# Patient Record
Sex: MALE | HISPANIC OR LATINO | Employment: FULL TIME | URBAN - METROPOLITAN AREA
[De-identification: names, ages, dates, MRNs, and addresses within clinical notes are randomized per-mention and may not be internally consistent; named-entity substitution may affect disease eponyms.]

---

## 2018-10-25 ENCOUNTER — OFFICE VISIT (OUTPATIENT)
Dept: FAMILY MEDICINE CLINIC | Facility: CLINIC | Age: 58
End: 2018-10-25

## 2018-10-25 VITALS
HEART RATE: 72 BPM | BODY MASS INDEX: 30.72 KG/M2 | DIASTOLIC BLOOD PRESSURE: 82 MMHG | WEIGHT: 207.4 LBS | RESPIRATION RATE: 18 BRPM | TEMPERATURE: 98.3 F | SYSTOLIC BLOOD PRESSURE: 124 MMHG | HEIGHT: 69 IN | OXYGEN SATURATION: 98 %

## 2018-10-25 DIAGNOSIS — Z00.00 HEALTHCARE MAINTENANCE: Primary | ICD-10-CM

## 2018-10-25 LAB
ALBUMIN SERPL-MCNC: 4.59 G/DL (ref 3.2–4.8)
ALBUMIN/GLOB SERPL: 2.1 G/DL (ref 1.5–2.5)
ALP SERPL-CCNC: 83 U/L (ref 25–100)
ALT SERPL W P-5'-P-CCNC: 27 U/L (ref 7–40)
ANION GAP SERPL CALCULATED.3IONS-SCNC: 3 MMOL/L (ref 3–11)
ARTICHOKE IGE QN: 156 MG/DL (ref 0–130)
AST SERPL-CCNC: 16 U/L (ref 0–33)
BASOPHILS # BLD AUTO: 0.04 10*3/MM3 (ref 0–0.2)
BASOPHILS NFR BLD AUTO: 0.5 % (ref 0–1)
BILIRUB BLD-MCNC: NEGATIVE MG/DL
BILIRUB SERPL-MCNC: 0.4 MG/DL (ref 0.3–1.2)
BUN BLD-MCNC: 15 MG/DL (ref 9–23)
BUN/CREAT SERPL: 16 (ref 7–25)
CALCIUM SPEC-SCNC: 9.6 MG/DL (ref 8.7–10.4)
CHLORIDE SERPL-SCNC: 106 MMOL/L (ref 99–109)
CHOLEST SERPL-MCNC: 213 MG/DL (ref 0–200)
CLARITY, POC: CLEAR
CO2 SERPL-SCNC: 30 MMOL/L (ref 20–31)
COLOR UR: YELLOW
CREAT BLD-MCNC: 0.94 MG/DL (ref 0.6–1.3)
CRP SERPL-MCNC: 0.29 MG/DL (ref 0–1)
DEPRECATED RDW RBC AUTO: 44 FL (ref 37–54)
EOSINOPHIL # BLD AUTO: 0.19 10*3/MM3 (ref 0–0.3)
EOSINOPHIL NFR BLD AUTO: 2.2 % (ref 0–3)
ERYTHROCYTE [DISTWIDTH] IN BLOOD BY AUTOMATED COUNT: 13.5 % (ref 11.3–14.5)
GFR SERPL CREATININE-BSD FRML MDRD: 83 ML/MIN/1.73
GLOBULIN UR ELPH-MCNC: 2.2 GM/DL
GLUCOSE BLD-MCNC: 83 MG/DL (ref 70–100)
GLUCOSE UR STRIP-MCNC: NEGATIVE MG/DL
HBA1C MFR BLD: 6.1 % (ref 4.8–5.6)
HCT VFR BLD AUTO: 47.6 % (ref 38.9–50.9)
HCV AB SER DONR QL: NORMAL
HDLC SERPL-MCNC: 40 MG/DL (ref 40–60)
HGB BLD-MCNC: 15.8 G/DL (ref 13.1–17.5)
HIV1+2 AB SER QL: NORMAL
IMM GRANULOCYTES # BLD: 0.02 10*3/MM3 (ref 0–0.03)
IMM GRANULOCYTES NFR BLD: 0.2 % (ref 0–0.6)
KETONES UR QL: NEGATIVE
LEUKOCYTE EST, POC: NEGATIVE
LYMPHOCYTES # BLD AUTO: 3.77 10*3/MM3 (ref 0.6–4.8)
LYMPHOCYTES NFR BLD AUTO: 43.5 % (ref 24–44)
MCH RBC QN AUTO: 29.8 PG (ref 27–31)
MCHC RBC AUTO-ENTMCNC: 33.2 G/DL (ref 32–36)
MCV RBC AUTO: 89.6 FL (ref 80–99)
MONOCYTES # BLD AUTO: 0.56 10*3/MM3 (ref 0–1)
MONOCYTES NFR BLD AUTO: 6.5 % (ref 0–12)
NEUTROPHILS # BLD AUTO: 4.1 10*3/MM3 (ref 1.5–8.3)
NEUTROPHILS NFR BLD AUTO: 47.3 % (ref 41–71)
NITRITE UR-MCNC: NEGATIVE MG/ML
PH UR: 6.5 [PH] (ref 5–8)
PLATELET # BLD AUTO: 235 10*3/MM3 (ref 150–450)
PMV BLD AUTO: 12.7 FL (ref 6–12)
POTASSIUM BLD-SCNC: 4.2 MMOL/L (ref 3.5–5.5)
PROT SERPL-MCNC: 6.8 G/DL (ref 5.7–8.2)
PROT UR STRIP-MCNC: NEGATIVE MG/DL
PSA SERPL-MCNC: 0.32 NG/ML (ref 0–4)
RBC # BLD AUTO: 5.31 10*6/MM3 (ref 4.2–5.76)
RBC # UR STRIP: NEGATIVE /UL
SODIUM BLD-SCNC: 139 MMOL/L (ref 132–146)
SP GR UR: 1.02 (ref 1–1.03)
TRIGL SERPL-MCNC: 270 MG/DL (ref 0–150)
TSH SERPL DL<=0.05 MIU/L-ACNC: 1.02 MIU/ML (ref 0.35–5.35)
URATE SERPL-MCNC: 7.3 MG/DL (ref 3.7–9.2)
UROBILINOGEN UR QL: NORMAL
WBC NRBC COR # BLD: 8.66 10*3/MM3 (ref 3.5–10.8)

## 2018-10-25 PROCEDURE — 80061 LIPID PANEL: CPT | Performed by: FAMILY MEDICINE

## 2018-10-25 PROCEDURE — 90686 IIV4 VACC NO PRSV 0.5 ML IM: CPT | Performed by: FAMILY MEDICINE

## 2018-10-25 PROCEDURE — 84550 ASSAY OF BLOOD/URIC ACID: CPT | Performed by: FAMILY MEDICINE

## 2018-10-25 PROCEDURE — 90472 IMMUNIZATION ADMIN EACH ADD: CPT | Performed by: FAMILY MEDICINE

## 2018-10-25 PROCEDURE — 86140 C-REACTIVE PROTEIN: CPT | Performed by: FAMILY MEDICINE

## 2018-10-25 PROCEDURE — 86803 HEPATITIS C AB TEST: CPT | Performed by: FAMILY MEDICINE

## 2018-10-25 PROCEDURE — 36415 COLL VENOUS BLD VENIPUNCTURE: CPT | Performed by: FAMILY MEDICINE

## 2018-10-25 PROCEDURE — 80053 COMPREHEN METABOLIC PANEL: CPT | Performed by: FAMILY MEDICINE

## 2018-10-25 PROCEDURE — 99396 PREV VISIT EST AGE 40-64: CPT | Performed by: FAMILY MEDICINE

## 2018-10-25 PROCEDURE — G0432 EIA HIV-1/HIV-2 SCREEN: HCPCS | Performed by: FAMILY MEDICINE

## 2018-10-25 PROCEDURE — 90632 HEPA VACCINE ADULT IM: CPT | Performed by: FAMILY MEDICINE

## 2018-10-25 PROCEDURE — 90715 TDAP VACCINE 7 YRS/> IM: CPT | Performed by: FAMILY MEDICINE

## 2018-10-25 PROCEDURE — 83036 HEMOGLOBIN GLYCOSYLATED A1C: CPT | Performed by: FAMILY MEDICINE

## 2018-10-25 PROCEDURE — G0103 PSA SCREENING: HCPCS | Performed by: FAMILY MEDICINE

## 2018-10-25 PROCEDURE — 90471 IMMUNIZATION ADMIN: CPT | Performed by: FAMILY MEDICINE

## 2018-10-25 PROCEDURE — 81003 URINALYSIS AUTO W/O SCOPE: CPT | Performed by: FAMILY MEDICINE

## 2018-10-25 PROCEDURE — 84443 ASSAY THYROID STIM HORMONE: CPT | Performed by: FAMILY MEDICINE

## 2018-10-25 PROCEDURE — 85025 COMPLETE CBC W/AUTO DIFF WBC: CPT | Performed by: FAMILY MEDICINE

## 2018-10-25 NOTE — PROGRESS NOTES
Subjective   Alf Fields is a 58 y.o. male.     History of Present Illness   He is here for his annual fasting wellness evaluation.  He is amendable to updating his immunizations.  He voices concerns with ED and is requesting a script for a phosphodiesterase inhibitor.     Review of Systems   Constitutional: Negative.    HENT: Negative.    Eyes: Negative.    Respiratory: Negative.    Cardiovascular: Negative.    Gastrointestinal: Negative.    Endocrine: Negative.    Genitourinary: Negative.    Musculoskeletal: Negative.    Skin: Negative.    Allergic/Immunologic: Negative.    Neurological: Negative.    Hematological: Negative.    Psychiatric/Behavioral: Negative.        Objective   Physical Exam   Constitutional: He is oriented to person, place, and time. He appears well-developed and well-nourished. He is cooperative.   HENT:   Head: Normocephalic and atraumatic.   Right Ear: Hearing and external ear normal.   Left Ear: Hearing and external ear normal.   Nose: Nose normal.   Mouth/Throat: Uvula is midline, oropharynx is clear and moist and mucous membranes are normal.   Eyes: Pupils are equal, round, and reactive to light. Conjunctivae and EOM are normal. No scleral icterus.   Neck: Trachea normal and normal range of motion. Neck supple. No JVD present. Carotid bruit is not present. No thyromegaly present.   Cardiovascular: Normal rate, regular rhythm, normal heart sounds and intact distal pulses.    Pulmonary/Chest: Effort normal and breath sounds normal.   Abdominal: Soft. Bowel sounds are normal. There is no hepatosplenomegaly. There is no tenderness.   Musculoskeletal: Normal range of motion.   Lymphadenopathy:     He has no cervical adenopathy.   Neurological: He is alert and oriented to person, place, and time. He has normal strength and normal reflexes. No sensory deficit. Gait normal.   Skin: Skin is warm and dry.   Psychiatric: He has a normal mood and affect. His speech is normal and behavior is normal.  Judgment and thought content normal. Cognition and memory are normal.   Nursing note and vitals reviewed.      Assessment/Plan   Diagnoses and all orders for this visit:    Healthcare maintenance  -     POC Urinalysis Dipstick, Automated  -     Comprehensive Metabolic Panel  -     CBC & Differential  -     Lipid Panel  -     TSH  -     Uric Acid  -     C-reactive Protein  -     PSA Screen  -     HIV-1 / O / 2 Ag / Antibody 4th Generation  -     Hepatitis C Antibody  -     Hemoglobin A1c  -     Tdap Vaccine IM  -     Hepatitis A Vaccine Adult IM  -     Fluarix/Fluzone/Afluria Quad>6 Months  - Vaccine counseling and current VIS is provided for immunizations administered today.  -     Ambulatory Referral For Screening Colonoscopy    The patient is here for a health maintenance visit.  Currently, the patient consumes a healthy diet and has an adequate exercise regimen. Screening lab work is ordered.  Immunizations are reported as current.  Advice and education is given regarding nutrition, aerobic exercise, routine dental evaluations, routine eye exams, reproductive health, cardiovascular risk reduction, sunscreen use, self skin examination (annual dermatology evaluations) and seat belt use (general overall safety).  Further recommendations after lab evaluation.  Annual wellness evaluations recommended.

## 2018-10-29 DIAGNOSIS — N52.02 CORPORO-VENOUS OCCLUSIVE ERECTILE DYSFUNCTION: Primary | ICD-10-CM

## 2018-10-29 RX ORDER — SILDENAFIL CITRATE 20 MG/1
TABLET ORAL
Qty: 30 TABLET | Refills: 5 | Status: SHIPPED | OUTPATIENT
Start: 2018-10-29 | End: 2020-04-29 | Stop reason: SDUPTHER

## 2018-10-29 NOTE — PROGRESS NOTES
Your lab results are all stable and satisfactory.  Please follow a low fat, healthy heart diet and exercise daily.  If you have any questions or concerns, please don't hesitate to contact us.  Thank you.

## 2018-11-07 DIAGNOSIS — Z12.11 SCREENING FOR COLON CANCER: Primary | ICD-10-CM

## 2018-11-07 RX ORDER — SODIUM, POTASSIUM,MAG SULFATES 17.5-3.13G
SOLUTION, RECONSTITUTED, ORAL ORAL
Qty: 2 BOTTLE | Refills: 0 | Status: SHIPPED | OUTPATIENT
Start: 2018-11-07 | End: 2020-04-29

## 2018-11-12 ENCOUNTER — TELEPHONE (OUTPATIENT)
Dept: FAMILY MEDICINE CLINIC | Facility: CLINIC | Age: 58
End: 2018-11-12

## 2018-11-12 NOTE — TELEPHONE ENCOUNTER
"barbie orellana (Key: [id number])   PA sent 11/12/2018      Regarding: FW: Prescription Question  Contact: [phone]      ----- Message -----  From: Barbie Orellana  Sent: 11/12/2018  12:26 PM  To: [facility]  Subject: Prescription Question                            ----- Message from Mychart, Generic sent at 11/12/2018 12:26 PM EST -----    My Sildenafil prescription needs a \"PA\" from you. Can you let me know when this is complete so I can  the prescription?     Thanks,     Manny Orellana     "

## 2018-11-14 ENCOUNTER — LAB REQUISITION (OUTPATIENT)
Dept: LAB | Facility: HOSPITAL | Age: 58
End: 2018-11-14

## 2018-11-14 ENCOUNTER — OUTSIDE FACILITY SERVICE (OUTPATIENT)
Dept: GASTROENTEROLOGY | Facility: CLINIC | Age: 58
End: 2018-11-14

## 2018-11-14 DIAGNOSIS — Z12.11 ENCOUNTER FOR SCREENING FOR MALIGNANT NEOPLASM OF COLON: ICD-10-CM

## 2018-11-14 PROCEDURE — 88305 TISSUE EXAM BY PATHOLOGIST: CPT | Performed by: INTERNAL MEDICINE

## 2018-11-14 PROCEDURE — 45385 COLONOSCOPY W/LESION REMOVAL: CPT | Performed by: INTERNAL MEDICINE

## 2018-11-15 LAB
CYTO UR: NORMAL
LAB AP CASE REPORT: NORMAL
LAB AP CLINICAL INFORMATION: NORMAL
PATH REPORT.FINAL DX SPEC: NORMAL
PATH REPORT.GROSS SPEC: NORMAL

## 2020-04-29 ENCOUNTER — OFFICE VISIT (OUTPATIENT)
Dept: FAMILY MEDICINE CLINIC | Facility: CLINIC | Age: 60
End: 2020-04-29

## 2020-04-29 VITALS
BODY MASS INDEX: 30.6 KG/M2 | TEMPERATURE: 97.8 F | OXYGEN SATURATION: 98 % | RESPIRATION RATE: 20 BRPM | WEIGHT: 206.6 LBS | DIASTOLIC BLOOD PRESSURE: 80 MMHG | HEIGHT: 69 IN | SYSTOLIC BLOOD PRESSURE: 132 MMHG | HEART RATE: 67 BPM

## 2020-04-29 DIAGNOSIS — K27.9 PEPTIC ULCER DISEASE: Primary | ICD-10-CM

## 2020-04-29 DIAGNOSIS — N52.02 CORPORO-VENOUS OCCLUSIVE ERECTILE DYSFUNCTION: ICD-10-CM

## 2020-04-29 PROBLEM — N52.9 ERECTILE DYSFUNCTION: Status: ACTIVE | Noted: 2018-01-01

## 2020-04-29 PROCEDURE — 99213 OFFICE O/P EST LOW 20 MIN: CPT | Performed by: FAMILY MEDICINE

## 2020-04-29 RX ORDER — PANTOPRAZOLE SODIUM 40 MG/1
40 TABLET, DELAYED RELEASE ORAL DAILY
Qty: 30 TABLET | Refills: 1 | Status: SHIPPED | OUTPATIENT
Start: 2020-04-29 | End: 2020-04-29

## 2020-04-29 RX ORDER — PANTOPRAZOLE SODIUM 40 MG/1
40 TABLET, DELAYED RELEASE ORAL DAILY
Qty: 30 TABLET | Refills: 1 | Status: SHIPPED | OUTPATIENT
Start: 2020-04-29 | End: 2020-07-01 | Stop reason: SDUPTHER

## 2020-04-29 RX ORDER — SILDENAFIL CITRATE 20 MG/1
TABLET ORAL
Qty: 30 TABLET | Refills: 5 | Status: SHIPPED | OUTPATIENT
Start: 2020-04-29 | End: 2020-07-01 | Stop reason: SDUPTHER

## 2020-04-29 NOTE — ASSESSMENT & PLAN NOTE
Patient is tried sildenafil in the past but was unable to afford it.  He was given refill on this and advised to use good Rx at [facility] to make prescription affordable.

## 2020-04-29 NOTE — ASSESSMENT & PLAN NOTE
Patient having epigastric pain could be peptic ulcer disease versus gastritis versus GERD versus abnormal presentation for gallbladder disease.  Patient symptoms most suspicious of peptic ulcer disease.  Will treat with Protonix daily.  If patient continues to have symptoms we will send for ultrasound gallbladder.

## 2020-04-29 NOTE — PROGRESS NOTES
Alf Fields is a 60 y.o. male who presents today for Abdominal Pain      Abdominal Pain   This is a recurrent problem. The current episode started more than 1 year ago (Had ultrasound of galbladder in 2014. Has come and gone since that time sometimes would not have episode for a year. Has had episodes more frequently over the past several months. Happens a few times a week now.). The onset quality is gradual. The problem occurs intermittently. The most recent episode lasted 3 hours (Peak of pain last a couple of hours. Has lingering soreness for several days. Last episode was friday and still feels sore. ). The problem has been gradually worsening (waxes and wanes but worsening in past few months. ). The pain is located in the epigastric region. The pain is at a severity of 9/10 (9-8/10 at its worst. Currently 2/10.). The pain is severe. The quality of the pain is aching and burning. The abdominal pain does not radiate. Associated symptoms include belching. Pertinent negatives include no anorexia, arthralgias, constipation, diarrhea, dysuria, fever, flatus, frequency, headaches, hematochezia, hematuria, melena, myalgias, nausea, vomiting or weight loss. The pain is aggravated by certain positions, movement and eating (spicy food, greasy food worsened he avoids now but is unsure what triggered friday. recumbency.). The pain is relieved by liquids and standing. He has tried antacids (Advil has helped with pain) for the symptoms. The treatment provided no relief. Prior diagnostic workup includes ultrasound (US in 2014. Nothing for recent episodes. ). There is no history of abdominal surgery, colon cancer, Crohn's disease, gallstones, GERD, irritable bowel syndrome, pancreatitis, PUD or ulcerative colitis.        Review of Systems   Constitutional: Negative for fever and unexpected weight loss.   Gastrointestinal: Positive for abdominal pain. Negative for anorexia, constipation, diarrhea, flatus, hematochezia, melena,  "nausea and vomiting.   Genitourinary: Negative for dysuria, frequency and hematuria.   Musculoskeletal: Negative for arthralgias and myalgias.        The following portions of the patient's history were reviewed and updated as appropriate: allergies, current medications, past family history, past medical history, past social history, past surgical history and problem list.    Current Outpatient Medications on File Prior to Visit   Medication Sig Dispense Refill   • diphenhydrAMINE HCl (ALLERGY MED PO) Take  by mouth.     • [DISCONTINUED] sildenafil (REVATIO) 20 MG tablet Take 3 to 5 tabs po once daily prn ED 30 tablet 5   • [DISCONTINUED] sodium-potassium-magnesium sulfates (SUPREP BOWEL PREP KIT) 17.5-3.13-1.6 GM/177ML solution oral solution Dispense 1 kit. Follow instructions that were mailed to your home. If you didn't receive these call [phone]. 4 bottle 0     No current facility-administered medications on file prior to visit.        No Known Allergies     Visit Vitals  /80 (BP Location: Right arm, Patient Position: Sitting, Cuff Size: Adult)   Pulse 67   Temp 97.8 °F (36.6 °C) (Temporal)   Resp 20   Ht 175.3 cm (69\")   Wt 93.7 kg (206 lb 9.6 oz)   SpO2 98%   BMI 30.51 kg/m²        Physical Exam   Constitutional: He is oriented to person, place, and time. He appears well-developed and well-nourished. No distress.   HENT:   Head: Atraumatic.   Eyes: EOM are normal.   Neck: Normal range of motion. Neck supple.   Cardiovascular: Normal rate, regular rhythm, normal heart sounds and intact distal pulses. Exam reveals no gallop and no friction rub.   No murmur heard.  Pulmonary/Chest: Effort normal and breath sounds normal. No respiratory distress. He has no wheezes. He has no rales.   Abdominal: Soft. Bowel sounds are normal. He exhibits no distension and no mass. There is tenderness (Epigastric tenderness no right upper quadrant tenderness). There is no rebound and no guarding. No hernia. "   Musculoskeletal: He exhibits no edema.   Neurological: He is alert and oriented to person, place, and time.   Skin: Skin is warm and dry. He is not diaphoretic.   Psychiatric: He has a normal mood and affect. His behavior is normal.             Problems Addressed this Visit        Digestive    Peptic ulcer disease - Primary     Patient having epigastric pain could be peptic ulcer disease versus gastritis versus GERD versus abnormal presentation for gallbladder disease.  Patient symptoms most suspicious of peptic ulcer disease.  Will treat with Protonix daily.  If patient continues to have symptoms we will send for ultrasound gallbladder.         Relevant Medications    pantoprazole (Protonix) 40 MG EC tablet       Other    Erectile dysfunction     Patient is tried sildenafil in the past but was unable to afford it.  He was given refill on this and advised to use good Rx at [facility] to make prescription affordable.         Relevant Medications    sildenafil (REVATIO) 20 MG tablet          Return in about 2 weeks (around 5/13/2020) for Annual.    Parts of this office note have been dictated by voice recognition software. Grammatical and/or spelling errors may be present.    Jvoi Kelley MD   4/29/2020

## 2020-07-01 DIAGNOSIS — N52.02 CORPORO-VENOUS OCCLUSIVE ERECTILE DYSFUNCTION: ICD-10-CM

## 2020-07-01 DIAGNOSIS — K27.9 PEPTIC ULCER DISEASE: ICD-10-CM

## 2020-07-01 RX ORDER — PANTOPRAZOLE SODIUM 40 MG/1
40 TABLET, DELAYED RELEASE ORAL DAILY
Qty: 30 TABLET | Refills: 1 | Status: SHIPPED | OUTPATIENT
Start: 2020-07-01 | End: 2020-08-28

## 2020-07-02 RX ORDER — SILDENAFIL CITRATE 20 MG/1
TABLET ORAL
Qty: 30 TABLET | Refills: 5 | Status: SHIPPED | OUTPATIENT
Start: 2020-07-02 | End: 2021-02-17

## 2020-07-15 DIAGNOSIS — N52.02 CORPORO-VENOUS OCCLUSIVE ERECTILE DYSFUNCTION: ICD-10-CM

## 2020-07-15 RX ORDER — SILDENAFIL CITRATE 20 MG/1
TABLET ORAL
Qty: 30 TABLET | Refills: 5 | OUTPATIENT
Start: 2020-07-15

## 2020-08-28 ENCOUNTER — OFFICE VISIT (OUTPATIENT)
Dept: FAMILY MEDICINE CLINIC | Facility: CLINIC | Age: 60
End: 2020-08-28

## 2020-08-28 VITALS
RESPIRATION RATE: 14 BRPM | BODY MASS INDEX: 30.57 KG/M2 | WEIGHT: 206.4 LBS | TEMPERATURE: 99.6 F | HEART RATE: 76 BPM | OXYGEN SATURATION: 98 % | DIASTOLIC BLOOD PRESSURE: 80 MMHG | HEIGHT: 69 IN | SYSTOLIC BLOOD PRESSURE: 132 MMHG

## 2020-08-28 DIAGNOSIS — K27.9 PEPTIC ULCER DISEASE: ICD-10-CM

## 2020-08-28 DIAGNOSIS — Z12.5 SCREENING FOR PROSTATE CANCER: ICD-10-CM

## 2020-08-28 DIAGNOSIS — Z00.00 WELL ADULT EXAM: Primary | ICD-10-CM

## 2020-08-28 DIAGNOSIS — M47.816 SPONDYLOSIS OF LUMBAR REGION WITHOUT MYELOPATHY OR RADICULOPATHY: ICD-10-CM

## 2020-08-28 DIAGNOSIS — R10.11 POSTPRANDIAL RUQ PAIN: ICD-10-CM

## 2020-08-28 DIAGNOSIS — Z72.0 SMOKING TRYING TO QUIT: ICD-10-CM

## 2020-08-28 PROCEDURE — 85025 COMPLETE CBC W/AUTO DIFF WBC: CPT | Performed by: FAMILY MEDICINE

## 2020-08-28 PROCEDURE — 99396 PREV VISIT EST AGE 40-64: CPT | Performed by: FAMILY MEDICINE

## 2020-08-28 PROCEDURE — 84443 ASSAY THYROID STIM HORMONE: CPT | Performed by: FAMILY MEDICINE

## 2020-08-28 PROCEDURE — 90471 IMMUNIZATION ADMIN: CPT | Performed by: FAMILY MEDICINE

## 2020-08-28 PROCEDURE — 90632 HEPA VACCINE ADULT IM: CPT | Performed by: FAMILY MEDICINE

## 2020-08-28 PROCEDURE — 83036 HEMOGLOBIN GLYCOSYLATED A1C: CPT | Performed by: FAMILY MEDICINE

## 2020-08-28 PROCEDURE — 80061 LIPID PANEL: CPT | Performed by: FAMILY MEDICINE

## 2020-08-28 PROCEDURE — 80053 COMPREHEN METABOLIC PANEL: CPT | Performed by: FAMILY MEDICINE

## 2020-08-28 PROCEDURE — G0103 PSA SCREENING: HCPCS | Performed by: FAMILY MEDICINE

## 2020-08-28 RX ORDER — CYCLOBENZAPRINE HCL 10 MG
10 TABLET ORAL 3 TIMES DAILY PRN
Qty: 90 TABLET | Refills: 1 | Status: SHIPPED | OUTPATIENT
Start: 2020-08-28

## 2020-08-28 RX ORDER — PANTOPRAZOLE SODIUM 40 MG/1
40 TABLET, DELAYED RELEASE ORAL DAILY
Qty: 90 TABLET | Refills: 1 | Status: SHIPPED | OUTPATIENT
Start: 2020-08-28 | End: 2020-12-09 | Stop reason: SDUPTHER

## 2020-08-28 RX ORDER — VARENICLINE TARTRATE 1 MG/1
1 TABLET, FILM COATED ORAL 2 TIMES DAILY
Qty: 60 TABLET | Refills: 3 | Status: SHIPPED | OUTPATIENT
Start: 2020-09-25 | End: 2020-09-22 | Stop reason: SINTOL

## 2020-08-28 NOTE — PROGRESS NOTES
Alf Fields is a 60 y.o. male who presents today to complete annual exam.    Chief Complaint   Patient presents with   • Establish Care   • Annual Exam        Patient here to complete annual physical exam.  Patient states he eats a frequently unhealthy diet but tries to eat as best he can.  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  Patient does not exercise regularly.  He follows with him optometrist.  He has not been seen by dentist in decades and does not follow with dermatology.  Patient states he continues to have epigastric pain which has become more right upper quadrant pain lately that radiates to his back.  He notes that it worsens with fatty food greasy meals.  He also continues to have chronic low back pain and plantar fasciitis.       Review of Systems   Constitutional: Negative for fever and unexpected weight loss.   HENT: Negative for congestion, ear pain and sore throat.    Eyes: Negative for visual disturbance.   Respiratory: Negative for cough, shortness of breath and wheezing.    Cardiovascular: Negative for chest pain and palpitations.   Gastrointestinal: Positive for abdominal pain. Negative for blood in stool, constipation, diarrhea, nausea, vomiting and GERD.   Endocrine: Negative for polydipsia and polyuria.   Genitourinary: Negative for difficulty urinating.   Musculoskeletal: Positive for arthralgias and back pain. Negative for joint swelling.   Skin: Negative for rash and skin lesions.   Allergic/Immunologic: Negative for environmental allergies.   Neurological: Negative for seizures and syncope.   Hematological: Does not bruise/bleed easily.   Psychiatric/Behavioral: Negative for suicidal ideas.        PHQ-9 Depression Screening  Little interest or pleasure in doing things?     Feeling down, depressed, or hopeless?     Trouble falling or staying asleep, or sleeping too much?     Feeling tired or having little energy?     Poor appetite or overeating?     Feeling bad about yourself - or  that you are a failure or have let yourself or your family down?     Trouble concentrating on things, such as reading the newspaper or watching television?     Moving or speaking so slowly that other people could have noticed? Or the opposite - being so fidgety or restless that you have been moving around a lot more than usual?     Thoughts that you would be better off dead, or of hurting yourself in some way?     PHQ-9 Total Score     If you checked off any problems, how difficult have these problems made it for you to do your work, take care of things at home, or get along with other people?         Past Medical History:   Diagnosis Date   • Anxiety     Comes and goes with stress levels   • Degenerative joint disease (DJD) of lumbar spine    • Erectile dysfunction 2018    Can't afford the meds        Past Surgical History:   Procedure Laterality Date   • ANKLE ARTHROSCOPY Left    • COLONOSCOPY  2018        Family History   Problem Relation Age of Onset   • Other Mother         unknown   • Drug abuse Mother         Heroin   • Alcohol abuse Sister    • No Known Problems Daughter    • No Known Problems Son    • Cataracts Maternal Grandmother    • Arthritis Maternal Grandmother    • Other Maternal Grandfather         Unknown   • No Known Problems Daughter    • No Known Problems Son    • Early death Maternal Aunt         Hodgkins was 22 years old        Social History     Socioeconomic History   • Marital status:      Spouse name: Arely   • Number of children: 2   • Years of education: H.S.   • Highest education level: Not on file   Occupational History   • Occupation: Manager     Employer: [employer]   Tobacco Use   • Smoking status: Light Tobacco Smoker     Packs/day: 0.25     Years: 15.00     Pack years: 3.75     Types: Cigarettes     Last attempt to quit: 2018     Years since quittin.6   • Smokeless tobacco: Never Used   • Tobacco comment: I have tried and tried to quit. May want to try Chantix  "  Substance and Sexual Activity   • Alcohol use: No   • Drug use: No   • Sexual activity: Yes     Partners: Female     Birth control/protection: None        Current Outpatient Medications on File Prior to Visit   Medication Sig Dispense Refill   • sildenafil (REVATIO) 20 MG tablet Take 3 to 5 tabs po once daily prn ED 30 tablet 5   • [DISCONTINUED] pantoprazole (Protonix) 40 MG EC tablet Take 1 tablet by mouth Daily. 30 tablet 1   • [DISCONTINUED] diphenhydrAMINE HCl (ALLERGY MED PO) Take  by mouth.       No current facility-administered medications on file prior to visit.        No Known Allergies     Visit Vitals  /80   Pulse 76   Temp 99.6 °F (37.6 °C) (Temporal)   Resp 14   Ht 174 cm (68.5\")   Wt 93.6 kg (206 lb 6.4 oz)   SpO2 98%   BMI 30.93 kg/m²      Body mass index is 30.93 kg/m².    Physical Exam   Constitutional: He is oriented to person, place, and time. He appears well-developed and well-nourished. No distress.   HENT:   Head: Atraumatic.   Eyes: EOM are normal.   Neck: Normal range of motion. Neck supple.   Cardiovascular: Normal rate, regular rhythm, normal heart sounds and intact distal pulses. Exam reveals no gallop and no friction rub.   No murmur heard.  Pulmonary/Chest: Effort normal and breath sounds normal. No respiratory distress. He has no wheezes. He has no rales.   Abdominal: Soft. Bowel sounds are normal. He exhibits no distension and no mass. There is tenderness (Mild right upper quadrant tenderness.). There is no rebound and no guarding. No hernia.   Musculoskeletal: He exhibits no edema.   Neurological: He is alert and oriented to person, place, and time.   Skin: Skin is warm and dry. He is not diaphoretic.   Psychiatric: He has a normal mood and affect. His behavior is normal.             Problems Addressed this Visit        Digestive    Peptic ulcer disease    Relevant Medications    pantoprazole (Protonix) 40 MG EC tablet       Nervous and Auditory    Postprandial RUQ pain     " Patient will continue pantoprazole.  Will obtain right upper quadrant ultrasound.  If ultrasound did not show any gallbladder abnormality will refer to GI.         Relevant Orders    US Gallbladder       Musculoskeletal and Integument    Degenerative joint disease (DJD) of lumbar spine    Relevant Medications    cyclobenzaprine (FLEXERIL) 10 MG tablet       Other    Well adult exam - Primary     The patient is here for health maintenance visit.  Currently, the patient consumes a unhealthy diet and has an inadequate exercise regimen.  Screening lab work is ordered.  Immunizations were reviewed today.  Advice and education was given regarding nutrition, aerobic exercise, routine dental evaluations, routine eye exams, reproductive health, cardiovascular risk reduction, sunscreen use, self skin examination (annual dermatology evaluations) and seatbelt use (general overall safety).  Further recommendations will be given if needed after lab evaluation.  Annual wellness evaluation is recommended.           Relevant Orders    CBC & Differential    Comprehensive Metabolic Panel    Hemoglobin A1c    Lipid Panel    PSA Screen    TSH Rfx On Abnormal To Free T4    Hepatitis A Vaccine Adult IM (Completed)    CBC Auto Differential      Other Visit Diagnoses     Screening for prostate cancer        Relevant Orders    PSA Screen    Smoking trying to quit        Relevant Medications    varenicline (Chantix Starting Month Pantera) 0.5 MG X 11 & 1 MG X 42 tablet    varenicline (Chantix Continuing Month Pantera) 1 MG tablet (Start on 9/25/2020)          Return in about 6 months (around 2/28/2021) for Follow-up smoking and RUQ pain.    Parts of this office note have been dictated by voice recognition software. Grammatical and/or spelling errors may be present.     Jovi Kelley MD  8/28/2020

## 2020-08-28 NOTE — ASSESSMENT & PLAN NOTE
Patient will continue pantoprazole.  Will obtain right upper quadrant ultrasound.  If ultrasound did not show any gallbladder abnormality will refer to GI.

## 2020-08-28 NOTE — PATIENT INSTRUCTIONS
Preventive Care 40-64 Years Old, Male  Preventive care refers to lifestyle choices and visits with your health care provider that can promote health and wellness. This includes:  · A yearly physical exam. This is also called an annual well check.  · Regular dental and eye exams.  · Immunizations.  · Screening for certain conditions.  · Healthy lifestyle choices, such as eating a healthy diet, getting regular exercise, not using drugs or products that contain nicotine and tobacco, and limiting alcohol use.  What can I expect for my preventive care visit?  Physical exam  Your health care provider will check:  · Height and weight. These may be used to calculate body mass index (BMI), which is a measurement that tells if you are at a healthy weight.  · Heart rate and blood pressure.  · Your skin for abnormal spots.  Counseling  Your health care provider may ask you questions about:  · Alcohol, tobacco, and drug use.  · Emotional well-being.  · Home and relationship well-being.  · Sexual activity.  · Eating habits.  · Work and work environment.  What immunizations do I need?    Influenza (flu) vaccine  · This is recommended every year.  Tetanus, diphtheria, and pertussis (Tdap) vaccine  · You may need a Td booster every 10 years.  Varicella (chickenpox) vaccine  · You may need this vaccine if you have not already been vaccinated.  Zoster (shingles) vaccine  · You may need this after age 60.  Measles, mumps, and rubella (MMR) vaccine  · You may need at least one dose of MMR if you were born in 1957 or later. You may also need a second dose.  Pneumococcal conjugate (PCV13) vaccine  · You may need this if you have certain conditions and were not previously vaccinated.  Pneumococcal polysaccharide (PPSV23) vaccine  · You may need one or two doses if you smoke cigarettes or if you have certain conditions.  Meningococcal conjugate (MenACWY) vaccine  · You may need this if you have certain conditions.  Hepatitis A  vaccine  · You may need this if you have certain conditions or if you travel or work in places where you may be exposed to hepatitis A.  Hepatitis B vaccine  · You may need this if you have certain conditions or if you travel or work in places where you may be exposed to hepatitis B.  Haemophilus influenzae type b (Hib) vaccine  · You may need this if you have certain risk factors.  Human papillomavirus (HPV) vaccine  · If recommended by your health care provider, you may need three doses over 6 months.  You may receive vaccines as individual doses or as more than one vaccine together in one shot (combination vaccines). Talk with your health care provider about the risks and benefits of combination vaccines.  What tests do I need?  Blood tests  · Lipid and cholesterol levels. These may be checked every 5 years, or more frequently if you are over 50 years old.  · Hepatitis C test.  · Hepatitis B test.  Screening  · Lung cancer screening. You may have this screening every year starting at age 55 if you have a 30-pack-year history of smoking and currently smoke or have quit within the past 15 years.  · Prostate cancer screening. Recommendations will vary depending on your family history and other risks.  · Colorectal cancer screening. All adults should have this screening starting at age 50 and continuing until age 75. Your health care provider may recommend screening at age 45 if you are at increased risk. You will have tests every 1-10 years, depending on your results and the type of screening test.  · Diabetes screening. This is done by checking your blood sugar (glucose) after you have not eaten for a while (fasting). You may have this done every 1-3 years.  · Sexually transmitted disease (STD) testing.  Follow these instructions at home:  Eating and drinking  · Eat a diet that includes fresh fruits and vegetables, whole grains, lean protein, and low-fat dairy products.  · Take vitamin and mineral supplements as  recommended by your health care provider.  · Do not drink alcohol if your health care provider tells you not to drink.  · If you drink alcohol:  ? Limit how much you have to 0-2 drinks a day.  ? Be aware of how much alcohol is in your drink. In the [place] one drink equals one 12 oz bottle of beer (355 mL), one 5 oz glass of wine (148 mL), or one 1½ oz glass of hard liquor (44 mL).  Lifestyle  · Take daily care of your teeth and gums.  · Stay active. Exercise for at least 30 minutes on 5 or more days each week.  · Do not use any products that contain nicotine or tobacco, such as cigarettes, e-cigarettes, and chewing tobacco. If you need help quitting, ask your health care provider.  · If you are sexually active, practice safe sex. Use a condom or other form of protection to prevent STIs (sexually transmitted infections).  · Talk with your health care provider about taking a low-dose aspirin every day starting at age 50.  What's next?  · Go to your health care provider once a year for a well check visit.  · Ask your health care provider how often you should have your eyes and teeth checked.  · Stay up to date on all vaccines.  This information is not intended to replace advice given to you by your health care provider. Make sure you discuss any questions you have with your health care provider.  Document Released: 01/13/2017 Document Revised: 12/12/2019 Document Reviewed: 12/12/2019  [employer] Patient Education © 2020 [employer]

## 2020-08-29 LAB
ALBUMIN SERPL-MCNC: 4.7 G/DL (ref 3.5–5.2)
ALBUMIN/GLOB SERPL: 1.6 G/DL
ALP SERPL-CCNC: 78 U/L (ref 39–117)
ALT SERPL W P-5'-P-CCNC: 15 U/L (ref 1–41)
ANION GAP SERPL CALCULATED.3IONS-SCNC: 9.5 MMOL/L (ref 5–15)
AST SERPL-CCNC: 17 U/L (ref 1–40)
BASOPHILS # BLD AUTO: 0.06 10*3/MM3 (ref 0–0.2)
BASOPHILS NFR BLD AUTO: 0.8 % (ref 0–1.5)
BILIRUB SERPL-MCNC: 0.4 MG/DL (ref 0–1.2)
BUN SERPL-MCNC: 17 MG/DL (ref 6–20)
BUN/CREAT SERPL: 14.9 (ref 7–25)
CALCIUM SPEC-SCNC: 9.8 MG/DL (ref 8.6–10.5)
CHLORIDE SERPL-SCNC: 106 MMOL/L (ref 98–107)
CHOLEST SERPL-MCNC: 222 MG/DL (ref 0–200)
CO2 SERPL-SCNC: 24.5 MMOL/L (ref 22–29)
CREAT SERPL-MCNC: 1.14 MG/DL (ref 0.76–1.27)
DEPRECATED RDW RBC AUTO: 45.3 FL (ref 37–54)
EOSINOPHIL # BLD AUTO: 0.13 10*3/MM3 (ref 0–0.4)
EOSINOPHIL NFR BLD AUTO: 1.7 % (ref 0.3–6.2)
ERYTHROCYTE [DISTWIDTH] IN BLOOD BY AUTOMATED COUNT: 13.2 % (ref 12.3–15.4)
GFR SERPL CREATININE-BSD FRML MDRD: 66 ML/MIN/1.73
GFR SERPL CREATININE-BSD FRML MDRD: 80 ML/MIN/1.73
GLOBULIN UR ELPH-MCNC: 3 GM/DL
GLUCOSE SERPL-MCNC: 89 MG/DL (ref 65–99)
HBA1C MFR BLD: 5.4 % (ref 4.8–5.6)
HCT VFR BLD AUTO: 48 % (ref 37.5–51)
HDLC SERPL-MCNC: 39 MG/DL (ref 40–60)
HGB BLD-MCNC: 15.8 G/DL (ref 13–17.7)
IMM GRANULOCYTES # BLD AUTO: 0.02 10*3/MM3 (ref 0–0.05)
IMM GRANULOCYTES NFR BLD AUTO: 0.3 % (ref 0–0.5)
LDLC SERPL CALC-MCNC: 123 MG/DL (ref 0–100)
LDLC/HDLC SERPL: 3.16 {RATIO}
LYMPHOCYTES # BLD AUTO: 2.51 10*3/MM3 (ref 0.7–3.1)
LYMPHOCYTES NFR BLD AUTO: 33.5 % (ref 19.6–45.3)
MCH RBC QN AUTO: 30.3 PG (ref 26.6–33)
MCHC RBC AUTO-ENTMCNC: 32.9 G/DL (ref 31.5–35.7)
MCV RBC AUTO: 92.1 FL (ref 79–97)
MONOCYTES # BLD AUTO: 0.47 10*3/MM3 (ref 0.1–0.9)
MONOCYTES NFR BLD AUTO: 6.3 % (ref 5–12)
NEUTROPHILS NFR BLD AUTO: 4.3 10*3/MM3 (ref 1.7–7)
NEUTROPHILS NFR BLD AUTO: 57.4 % (ref 42.7–76)
NRBC BLD AUTO-RTO: 0 /100 WBC (ref 0–0.2)
PLATELET # BLD AUTO: 226 10*3/MM3 (ref 140–450)
PMV BLD AUTO: 12.2 FL (ref 6–12)
POTASSIUM SERPL-SCNC: 4.3 MMOL/L (ref 3.5–5.2)
PROT SERPL-MCNC: 7.7 G/DL (ref 6–8.5)
PSA SERPL-MCNC: 0.42 NG/ML (ref 0–4)
RBC # BLD AUTO: 5.21 10*6/MM3 (ref 4.14–5.8)
SODIUM SERPL-SCNC: 140 MMOL/L (ref 136–145)
TRIGL SERPL-MCNC: 298 MG/DL (ref 0–150)
TSH SERPL DL<=0.05 MIU/L-ACNC: 1.97 UIU/ML (ref 0.27–4.2)
VLDLC SERPL-MCNC: 59.6 MG/DL (ref 5–40)
WBC # BLD AUTO: 7.49 10*3/MM3 (ref 3.4–10.8)

## 2020-09-11 ENCOUNTER — HOSPITAL ENCOUNTER (OUTPATIENT)
Dept: ULTRASOUND IMAGING | Facility: HOSPITAL | Age: 60
Discharge: HOME | End: 2020-09-11
Admitting: FAMILY MEDICINE

## 2020-09-11 DIAGNOSIS — R10.11 POSTPRANDIAL RUQ PAIN: ICD-10-CM

## 2020-09-11 PROCEDURE — 76705 ECHO EXAM OF ABDOMEN: CPT

## 2020-09-22 ENCOUNTER — OFFICE VISIT (OUTPATIENT)
Dept: FAMILY MEDICINE CLINIC | Facility: CLINIC | Age: 60
End: 2020-09-22

## 2020-09-22 VITALS
HEIGHT: 69 IN | HEART RATE: 77 BPM | BODY MASS INDEX: 30.96 KG/M2 | WEIGHT: 209 LBS | OXYGEN SATURATION: 98 % | RESPIRATION RATE: 14 BRPM | DIASTOLIC BLOOD PRESSURE: 82 MMHG | TEMPERATURE: 98.6 F | SYSTOLIC BLOOD PRESSURE: 114 MMHG

## 2020-09-22 DIAGNOSIS — Z71.6 ENCOUNTER FOR SMOKING CESSATION COUNSELING: ICD-10-CM

## 2020-09-22 DIAGNOSIS — R10.11 POSTPRANDIAL RUQ PAIN: Primary | ICD-10-CM

## 2020-09-22 DIAGNOSIS — F41.9 ANXIETY: ICD-10-CM

## 2020-09-22 PROCEDURE — 90686 IIV4 VACC NO PRSV 0.5 ML IM: CPT | Performed by: FAMILY MEDICINE

## 2020-09-22 PROCEDURE — 99406 BEHAV CHNG SMOKING 3-10 MIN: CPT | Performed by: FAMILY MEDICINE

## 2020-09-22 PROCEDURE — 90471 IMMUNIZATION ADMIN: CPT | Performed by: FAMILY MEDICINE

## 2020-09-22 PROCEDURE — 99214 OFFICE O/P EST MOD 30 MIN: CPT | Performed by: FAMILY MEDICINE

## 2020-09-22 RX ORDER — VARENICLINE TARTRATE 0.5 MG/1
0.5 TABLET, FILM COATED ORAL 2 TIMES DAILY
Qty: 60 TABLET | Refills: 3 | Status: SHIPPED | OUTPATIENT
Start: 2020-09-22 | End: 2021-02-17 | Stop reason: SINTOL

## 2020-09-22 RX ORDER — ESCITALOPRAM OXALATE 10 MG/1
10 TABLET ORAL DAILY
Qty: 90 TABLET | Refills: 1 | Status: SHIPPED | OUTPATIENT
Start: 2020-09-22 | End: 2020-12-09 | Stop reason: SDUPTHER

## 2020-09-22 NOTE — PATIENT INSTRUCTIONS
Smoking Tobacco Information, Adult  Smoking tobacco can be harmful to your health. Tobacco contains a poisonous (toxic), colorless chemical called nicotine. Nicotine is addictive. It changes the brain and can make it hard to stop smoking. Tobacco also has other toxic chemicals that can hurt your body and raise your risk of many cancers.  How can smoking tobacco affect me?  Smoking tobacco puts you at risk for:  · Cancer. Smoking is most commonly associated with lung cancer, but can also lead to cancer in other parts of the body.  · Chronic obstructive pulmonary disease (COPD). This is a long-term lung condition that makes it hard to breathe. It also gets worse over time.  · High blood pressure (hypertension), heart disease, stroke, or heart attack.  · Lung infections, such as pneumonia.  · Cataracts. This is when the lenses in the eyes become clouded.  · Digestive problems. This may include peptic ulcers, heartburn, and gastroesophageal reflux disease (GERD).  · Oral health problems, such as gum disease and tooth loss.  · Loss of taste and smell.  Smoking can affect your appearance by causing:  · Wrinkles.  · Yellow or stained teeth, fingers, and fingernails.  Smoking tobacco can also affect your social life, because:  · It may be challenging to find places to smoke when away from home. Many workplaces, restaurants, hotels, and public places are tobacco-free.  · Smoking is expensive. This is due to the cost of tobacco and the long-term costs of treating health problems from smoking.  · Secondhand smoke may affect those around you. Secondhand smoke can cause lung cancer, breathing problems, and heart disease. Children of smokers have a higher risk for:  ? Sudden infant death syndrome (SIDS).  ? Ear infections.  ? Lung infections.  If you currently smoke tobacco, quitting now can help you:  · Lead a longer and healthier life.  · Look, smell, breathe, and feel better over time.  · Save money.  · Protect others from the  harms of secondhand smoke.  What actions can I take to prevent health problems?  Quit smoking    · Do not start smoking. Quit if you already do.  · Make a plan to quit smoking and commit to it. Look for programs to help you and ask your health care provider for recommendations and ideas.  · Set a date and write down all the reasons you want to quit.  · Let your friends and family know you are quitting so they can help and support you. Consider finding friends who also want to quit. It can be easier to quit with someone else, so that you can support each other.  · Talk with your health care provider about using nicotine replacement medicines to help you quit, such as gum, lozenges, patches, sprays, or pills.  · Do not replace cigarette smoking with electronic cigarettes, which are commonly called e-cigarettes. The safety of e-cigarettes is not known, and some may contain harmful chemicals.  · If you try to quit but return to smoking, stay positive. It is common to slip up when you first quit, so take it one day at a time.  · Be prepared for cravings. When you feel the urge to smoke, chew gum or suck on hard candy.  Lifestyle  · Stay busy and take care of your body.  · Drink enough fluid to keep your urine pale yellow.  · Get plenty of exercise and eat a healthy diet. This can help prevent weight gain after quitting.  · Monitor your eating habits. Quitting smoking can cause you to have a larger appetite than when you smoke.  · Find ways to relax. Go out with friends or family to a movie or a restaurant where people do not smoke.  · Ask your health care provider about having regular tests (screenings) to check for cancer. This may include blood tests, imaging tests, and other tests.  · Find ways to manage your stress, such as meditation, yoga, or exercise.  Where to find support  To get support to quit smoking, consider:  · Asking your health care provider for more information and resources.  · Taking classes to learn  more about quitting smoking.  · Looking for local organizations that offer resources about quitting smoking.  · Joining a support group for people who want to quit smoking in your local community.  · Calling the smokefree.gov counselor helpline: 4-800-Quit-Now (1-893.541.3322)  Where to find more information  You may find more information about quitting smoking from:  · HelpGuide.org: www.helpguide.org  · Smokefree.gov: smokefree.gov  · American Lung Association: www.lung.org  Contact a health care provider if you:  · Have problems breathing.  · Notice that your lips, nose, or fingers turn blue.  · Have chest pain.  · Are coughing up blood.  · Feel faint or you pass out.  · Have other health changes that cause you to worry.  Summary  · Smoking tobacco can negatively affect your health, the health of those around you, your finances, and your social life.  · Do not start smoking. Quit if you already do. If you need help quitting, ask your health care provider.  · Think about joining a support group for people who want to quit smoking in your local community. There are many effective programs that will help you to quit this behavior.  This information is not intended to replace advice given to you by your health care provider. Make sure you discuss any questions you have with your health care provider.  Document Released: 01/02/2018 Document Revised: 02/06/2019 Document Reviewed: 01/02/2018  Elsevier Patient Education © 2020 [employer]

## 2020-09-22 NOTE — ASSESSMENT & PLAN NOTE
Patient has had success in the past with medication but he does not know which one.  He would like to try medication again today.  Patient was started on Lexapro.  He will follow-up in 8 weeks for reevaluation of symptoms.

## 2020-09-22 NOTE — ASSESSMENT & PLAN NOTE
Alf Fields  reports that he has been smoking cigarettes. He has a 15.00 pack-year smoking history. He has never used smokeless tobacco.  He has currently decrease his smoking to 5 cigarettes a day. I have educated him on the risk of diseases from using tobacco products such as cancer, COPD and heart diease.     I advised him to quit and he is willing to quit. We have discussed the following method/s for tobacco cessation:  Education Material Counseling OTC Cessation Products Prescription Medicaiton.  Together we have set a quit date for 1 month from today.  He will follow up with me in 2 month or sooner to check on his progress.    I spent 7 minutes counseling the patient.

## 2020-09-22 NOTE — ASSESSMENT & PLAN NOTE
Ultrasound was unremarkable.  Patient has had some relief with pantoprazole but is still having symptoms.  Patient given referral to gastroenterology for further evaluation treatment.

## 2020-09-22 NOTE — PROGRESS NOTES
Alf Fields is a 60 y.o. male who presents today for Anxiety (x 10-14 days), Back Pain (chronic, this past week flare up has been bad), and Abdominal Pain (comes and goes at gallbladder locale)      He believes that his back pain was spurred on by starting Chantix. He stopped taking the medication 2 days ago and said that the pain has since subsided. Flexeril provided no relief. The only relief he found was by taking ibuprofen even though he knows that it can make his GI symptoms worse. He has a history of low back arthritis.  He has had increasing anxiety lately and does not know why. He has had anxiety in the past and took bupropion and zoloft, but currently takes nothing. He says that these were taken a long time ago, but he thinks they helped him a lot. He describes his anxiety as generalized because of the turmoil going on with the pandemic and his recent stomach and back pains.    Back Pain  This is a chronic (History of low back arthritis) problem. The current episode started in the past 7 days. The problem occurs constantly. The problem has been waxing and waning (Better today) since onset. The pain is present in the lumbar spine. The quality of the pain is described as shooting. The pain is at a severity of 7/10. The pain is the same all the time. The symptoms are aggravated by position, sitting and standing. Stiffness is present in the morning and all day. Associated symptoms include abdominal pain. Pertinent negatives include no bladder incontinence, bowel incontinence, chest pain, dysuria, fever, headaches, leg pain, numbness, paresis, paresthesias, perianal numbness, tingling, weakness or weight loss. He has tried NSAIDs for the symptoms. The treatment provided moderate relief.   Anxiety  Presents for follow-up visit. Symptoms include depressed mood, excessive worry, irritability, nausea and nervous/anxious behavior. Patient reports no chest pain, insomnia, shortness of breath or suicidal ideas.  Symptoms occur constantly. The severity of symptoms is interfering with daily activities. The quality of sleep is good. Nighttime awakenings: none.       Abdominal Pain  This is a chronic problem. The problem occurs intermittently. The problem has been gradually improving. The pain is located in the RUQ. The quality of the pain is aching. The abdominal pain does not radiate. Associated symptoms include anorexia and nausea. Pertinent negatives include no dysuria, fever, headaches or weight loss. The pain is aggravated by eating and palpation. He has tried proton pump inhibitors for the symptoms. The treatment provided moderate relief. Prior diagnostic workup includes ultrasound. His past medical history is significant for PUD.    NELDA-7  Over the last two weeks, how often have you been bothered by the following problems?  Feeling nervous, anxious or on edge: 1  Not being able to stop or control worryin  Worrying too much about different things: 1  Trouble Relaxin  Being so restless that it is hard to sit still: 0  Becoming easily annoyed or irritable: 1  Feeling afraid as if something awful might happen: 1  NELDA 7 Total Score: 6  If you checked any problems, how difficult have these problems made it for you to do your work, take care of things at home, or get along with other people: Very difficult        Review of Systems   Constitutional: Positive for irritability. Negative for fever and unexpected weight loss.   Eyes: Negative.    Respiratory: Negative for chest tightness and shortness of breath.    Cardiovascular: Negative for chest pain.   Gastrointestinal: Positive for abdominal pain, anorexia and nausea. Negative for bowel incontinence.   Genitourinary: Negative for dysuria and urinary incontinence.   Musculoskeletal: Positive for back pain.   Skin: Negative for color change and dry skin.   Neurological: Negative for tingling, weakness, numbness and paresthesias.   Psychiatric/Behavioral: Positive for  "depressed mood. Negative for suicidal ideas. The patient is nervous/anxious. The patient does not have insomnia.         The following portions of the patient's history were reviewed and updated as appropriate: allergies, current medications, past family history, past medical history, past social history, past surgical history and problem list.    Current Outpatient Medications on File Prior to Visit   Medication Sig Dispense Refill   • pantoprazole (Protonix) 40 MG EC tablet Take 1 tablet by mouth Daily. 90 tablet 1   • sildenafil (REVATIO) 20 MG tablet Take 3 to 5 tabs po once daily prn ED 30 tablet 5   • cyclobenzaprine (FLEXERIL) 10 MG tablet Take 1 tablet by mouth 3 (Three) Times a Day As Needed for Muscle Spasms. 90 tablet 1   • [DISCONTINUED] varenicline (Chantix Continuing Month Pantera) 1 MG tablet Take 1 tablet by mouth 2 (Two) Times a Day. 60 tablet 3   • [DISCONTINUED] varenicline (Chantix Starting Month Pantera) 0.5 MG X 11 & 1 MG X 42 tablet Take 0.5 mg one daily on days 1-3 and and 0.5 mg twice daily on days 4-7.Then 1 mg twice daily for a total of 12 weeks. 53 tablet 0     No current facility-administered medications on file prior to visit.        No Known Allergies     Visit Vitals  /82   Pulse 77   Temp 98.6 °F (37 °C) (Temporal)   Resp 14   Ht 175.3 cm (69\")   Wt 94.8 kg (209 lb)   SpO2 98%   BMI 30.86 kg/m²        Physical Exam  Constitutional:       Appearance: Normal appearance.   HENT:      Head: Normocephalic and atraumatic.      Nose: Nose normal. No congestion.      Mouth/Throat:      Mouth: Mucous membranes are moist.      Pharynx: Oropharynx is clear. No posterior oropharyngeal erythema.   Eyes:      Extraocular Movements: Extraocular movements intact.      Conjunctiva/sclera: Conjunctivae normal.      Pupils: Pupils are equal, round, and reactive to light.   Neck:      Musculoskeletal: Normal range of motion and neck supple. No muscular tenderness.   Cardiovascular:      Rate and Rhythm: " Normal rate and regular rhythm.      Pulses: Normal pulses.      Heart sounds: Normal heart sounds.   Pulmonary:      Effort: Pulmonary effort is normal.      Breath sounds: Normal breath sounds.   Abdominal:      General: Abdomen is flat. Bowel sounds are normal.      Palpations: Abdomen is soft.      Tenderness: There is abdominal tenderness in the right upper quadrant. There is guarding (RUQ).   Musculoskeletal:      Lumbar back: He exhibits tenderness (Midline). He exhibits normal range of motion.   Lymphadenopathy:      Cervical: No cervical adenopathy.   Skin:     General: Skin is warm and dry.   Neurological:      General: No focal deficit present.      Mental Status: He is alert and oriented to person, place, and time.   Psychiatric:         Attention and Perception: Attention normal.         Mood and Affect: Mood and affect normal.         Speech: Speech normal.         Behavior: Behavior normal.          Results for orders placed or performed in visit on 08/28/20   Comprehensive Metabolic Panel    Specimen: Arm, Left; Blood   Result Value Ref Range    Glucose 89 65 - 99 mg/dL    BUN 17 6 - 20 mg/dL    Creatinine 1.14 0.76 - 1.27 mg/dL    Sodium 140 136 - 145 mmol/L    Potassium 4.3 3.5 - 5.2 mmol/L    Chloride 106 98 - 107 mmol/L    CO2 24.5 22.0 - 29.0 mmol/L    Calcium 9.8 8.6 - 10.5 mg/dL    Total Protein 7.7 6.0 - 8.5 g/dL    Albumin 4.70 3.50 - 5.20 g/dL    ALT (SGPT) 15 1 - 41 U/L    AST (SGOT) 17 1 - 40 U/L    Alkaline Phosphatase 78 39 - 117 U/L    Total Bilirubin 0.4 0.0 - 1.2 mg/dL    eGFR Non African Amer 66 >60 mL/min/1.73    eGFR  African Amer 80 >60 mL/min/1.73    Globulin 3.0 gm/dL    A/G Ratio 1.6 g/dL    BUN/Creatinine Ratio 14.9 7.0 - 25.0    Anion Gap 9.5 5.0 - 15.0 mmol/L   Hemoglobin A1c    Specimen: Arm, Left; Blood   Result Value Ref Range    Hemoglobin A1C 5.40 4.80 - 5.60 %   Lipid Panel    Specimen: Arm, Left; Blood   Result Value Ref Range    Total Cholesterol 222 (H) 0 - 200  mg/dL    Triglycerides 298 (H) 0 - 150 mg/dL    HDL Cholesterol 39 (L) 40 - 60 mg/dL    LDL Cholesterol  123 (H) 0 - 100 mg/dL    VLDL Cholesterol 59.6 (H) 5 - 40 mg/dL    LDL/HDL Ratio 3.16    PSA Screen    Specimen: Arm, Left; Blood   Result Value Ref Range    PSA 0.420 0.000 - 4.000 ng/mL   TSH Rfx On Abnormal To Free T4    Specimen: Arm, Left; Blood   Result Value Ref Range    TSH 1.970 0.270 - 4.200 uIU/mL   CBC Auto Differential    Specimen: Arm, Left; Blood   Result Value Ref Range    WBC 7.49 3.40 - 10.80 10*3/mm3    RBC 5.21 4.14 - 5.80 10*6/mm3    Hemoglobin 15.8 13.0 - 17.7 g/dL    Hematocrit 48.0 37.5 - 51.0 %    MCV 92.1 79.0 - 97.0 fL    MCH 30.3 26.6 - 33.0 pg    MCHC 32.9 31.5 - 35.7 g/dL    RDW 13.2 12.3 - 15.4 %    RDW-SD 45.3 37.0 - 54.0 fl    MPV 12.2 (H) 6.0 - 12.0 fL    Platelets 226 140 - 450 10*3/mm3    Neutrophil % 57.4 42.7 - 76.0 %    Lymphocyte % 33.5 19.6 - 45.3 %    Monocyte % 6.3 5.0 - 12.0 %    Eosinophil % 1.7 0.3 - 6.2 %    Basophil % 0.8 0.0 - 1.5 %    Immature Grans % 0.3 0.0 - 0.5 %    Neutrophils, Absolute 4.30 1.70 - 7.00 10*3/mm3    Lymphocytes, Absolute 2.51 0.70 - 3.10 10*3/mm3    Monocytes, Absolute 0.47 0.10 - 0.90 10*3/mm3    Eosinophils, Absolute 0.13 0.00 - 0.40 10*3/mm3    Basophils, Absolute 0.06 0.00 - 0.20 10*3/mm3    Immature Grans, Absolute 0.02 0.00 - 0.05 10*3/mm3    nRBC 0.0 0.0 - 0.2 /100 WBC        Problems Addressed this Visit        Nervous and Auditory    Postprandial RUQ pain - Primary     Ultrasound was unremarkable.  Patient has had some relief with pantoprazole but is still having symptoms.  Patient given referral to gastroenterology for further evaluation treatment.         Relevant Orders    Ambulatory Referral to Gastroenterology       Other    Anxiety     Patient has had success in the past with medication but he does not know which one.  He would like to try medication again today.  Patient was started on Lexapro.  He will follow-up in 8 weeks for  reevaluation of symptoms.         Relevant Medications    escitalopram (Lexapro) 10 MG tablet    Encounter for smoking cessation counseling     Alf Fields  reports that he has been smoking cigarettes. He has a 15.00 pack-year smoking history. He has never used smokeless tobacco.  He has currently decrease his smoking to 5 cigarettes a day. I have educated him on the risk of diseases from using tobacco products such as cancer, COPD and heart diease.     I advised him to quit and he is willing to quit. We have discussed the following method/s for tobacco cessation:  Education Material Counseling OTC Cessation Products Prescription Medicaiton.  Together we have set a quit date for 1 month from today.  He will follow up with me in 2 month or sooner to check on his progress.    I spent 7 minutes counseling the patient.                Relevant Medications    varenicline (Chantix) 0.5 MG tablet          Return in about 8 weeks (around 11/17/2020) for Follow-up anxiety smoking.    Parts of this office note have been dictated by voice recognition software. Grammatical and/or spelling errors may be present.    Jovi Kelley MD   9/22/2020

## 2020-12-09 DIAGNOSIS — F41.9 ANXIETY: ICD-10-CM

## 2020-12-09 DIAGNOSIS — K27.9 PEPTIC ULCER DISEASE: ICD-10-CM

## 2020-12-09 RX ORDER — ESCITALOPRAM OXALATE 10 MG/1
10 TABLET ORAL DAILY
Qty: 90 TABLET | Refills: 1 | Status: SHIPPED | OUTPATIENT
Start: 2020-12-09 | End: 2021-02-17

## 2020-12-09 RX ORDER — PANTOPRAZOLE SODIUM 40 MG/1
40 TABLET, DELAYED RELEASE ORAL DAILY
Qty: 90 TABLET | Refills: 1 | Status: SHIPPED | OUTPATIENT
Start: 2020-12-09 | End: 2021-02-17

## 2021-01-07 ENCOUNTER — OFFICE VISIT (OUTPATIENT)
Dept: GASTROENTEROLOGY | Facility: CLINIC | Age: 61
End: 2021-01-07

## 2021-01-07 VITALS
TEMPERATURE: 97.8 F | DIASTOLIC BLOOD PRESSURE: 98 MMHG | WEIGHT: 216.8 LBS | HEIGHT: 69 IN | HEART RATE: 75 BPM | OXYGEN SATURATION: 96 % | SYSTOLIC BLOOD PRESSURE: 130 MMHG | BODY MASS INDEX: 32.11 KG/M2

## 2021-01-07 DIAGNOSIS — R10.11 RIGHT UPPER QUADRANT ABDOMINAL PAIN: Primary | ICD-10-CM

## 2021-01-07 DIAGNOSIS — K80.50 BILIARY COLIC: ICD-10-CM

## 2021-01-07 DIAGNOSIS — R10.13 DYSPEPSIA: ICD-10-CM

## 2021-01-07 DIAGNOSIS — R11.0 NAUSEA: ICD-10-CM

## 2021-01-07 PROCEDURE — 99214 OFFICE O/P EST MOD 30 MIN: CPT | Performed by: INTERNAL MEDICINE

## 2021-01-07 NOTE — PROGRESS NOTES
Patient Name: Alf Fields  YOB: 1960   Medical Record number: [id number]     PCP: Jovi Kelley MD    Chief Complaint   Patient presents with   • Abdominal Pain       History of Present Illness:   HPI  Mr. Fields presents to the office.  The patient has been experiencing for months right upper quadrant abdominal pain.  There is no radiation of the pain to the flank or back.  He does describe the pain being associated with meals particularly about 30 minutes to 1 hour afterwards.  The abdominal pain increases in intensity over several hours and then will slowly go away.  He has experienced nausea on one occasion.  The patient had an ultrasound a few months ago and there was pain doing the ultrasound procedure.  Mr. Fields denies any difficult or painful swallowing.  He rarely takes any nonsteroidal medication.  There is no history of breakthrough heartburn.  Mr. Fields denies any weight loss.  There is no history of emmanuel blood in the stool.  Past Medical History:   Diagnosis Date   • Anxiety     Comes and goes with stress levels   • Degenerative joint disease (DJD) of lumbar spine    • Erectile dysfunction 2018    Can't afford the meds       Past Surgical History:   Procedure Laterality Date   • ANKLE ARTHROSCOPY Left 2010   • COLONOSCOPY  2018         Current Outpatient Medications:   •  cyclobenzaprine (FLEXERIL) 10 MG tablet, Take 1 tablet by mouth 3 (Three) Times a Day As Needed for Muscle Spasms., Disp: 90 tablet, Rfl: 1  •  escitalopram (Lexapro) 10 MG tablet, Take 1 tablet by mouth Daily., Disp: 90 tablet, Rfl: 1  •  pantoprazole (Protonix) 40 MG EC tablet, Take 1 tablet by mouth Daily., Disp: 90 tablet, Rfl: 1  •  sildenafil (REVATIO) 20 MG tablet, Take 3 to 5 tabs po once daily prn ED, Disp: 30 tablet, Rfl: 5  •  varenicline (Chantix) 0.5 MG tablet, Take 1 tablet by mouth 2 (Two) Times a Day., Disp: 60 tablet, Rfl: 3    No Known Allergies    Family History   Problem Relation Age of  Onset   • Other Mother         unknown   • Drug abuse Mother         Heroin   • Alcohol abuse Sister    • No Known Problems Daughter    • No Known Problems Son    • Cataracts Maternal Grandmother    • Arthritis Maternal Grandmother    • Other Maternal Grandfather         Unknown   • No Known Problems Daughter    • No Known Problems Son    • Early death Maternal Aunt         Hodgkins was 22 years old       Social History     Socioeconomic History   • Marital status:      Spouse name: Arely   • Number of children: 2   • Years of education: H.S.   • Highest education level: Not on file   Occupational History   • Occupation: Manager     Employer: [employer]   Tobacco Use   • Smoking status: Heavy Tobacco Smoker     Packs/day: 1.00     Years: 15.00     Pack years: 15.00     Types: Cigarettes     Last attempt to quit: 2018     Years since quitting: 3.0   • Smokeless tobacco: Never Used   • Tobacco comment: Currently down to 5 cig per day   Substance and Sexual Activity   • Alcohol use: No   • Drug use: No   • Sexual activity: Yes     Partners: Female     Birth control/protection: None       Review of Systems   Constitutional: Negative for appetite change, fatigue, fever and unexpected weight change.   HENT: Negative for dental problem, mouth sores, postnasal drip, sneezing, trouble swallowing and voice change.    Eyes: Negative.  Negative for pain, redness and itching.   Respiratory: Negative.  Negative for cough, shortness of breath and wheezing.    Cardiovascular: Negative for chest pain, palpitations and leg swelling.   Gastrointestinal: Positive for abdominal distention (bloating), abdominal pain and nausea. Negative for anal bleeding, blood in stool, constipation, diarrhea, rectal pain and vomiting.        Burping   Endocrine: Negative.  Negative for cold intolerance, heat intolerance, polydipsia and polyuria.   Genitourinary: Negative for dysuria, enuresis, flank pain, frequency, hematuria and urgency.    Musculoskeletal: Negative for arthralgias, back pain, joint swelling and myalgias.   Skin: Negative.  Negative for color change, pallor and rash.   Allergic/Immunologic: Negative.  Negative for environmental allergies, food allergies and immunocompromised state.   Neurological: Positive for headaches. Negative for dizziness, tremors, seizures, facial asymmetry and numbness.   Hematological: Negative.    Psychiatric/Behavioral: Negative.  Negative for behavioral problems, dysphoric mood, hallucinations and self-injury.       Vitals:    01/07/21 1416   BP: 130/98   Pulse: 75   Temp: 97.8 °F (36.6 °C)   SpO2: 96%       Physical Exam  Vitals signs reviewed.   Constitutional:       General: He is not in acute distress.     Appearance: Normal appearance.   HENT:      Head: Normocephalic and atraumatic.      Nose: Nose normal.      Mouth/Throat:      Mouth: Mucous membranes are moist.      Pharynx: Oropharynx is clear.   Eyes:      General: No scleral icterus.     Extraocular Movements: Extraocular movements intact.   Neck:      Musculoskeletal: Normal range of motion. No neck rigidity.   Cardiovascular:      Rate and Rhythm: Normal rate and regular rhythm.      Heart sounds: No murmur. No gallop.    Pulmonary:      Effort: Pulmonary effort is normal.      Breath sounds: No wheezing or rales.   Abdominal:      General: Abdomen is flat.      Palpations: Abdomen is soft.      Tenderness: There is abdominal tenderness (right upper quadrant). There is no guarding.   Musculoskeletal: Normal range of motion.         General: No swelling.   Skin:     General: Skin is warm and dry.      Coloration: Skin is not jaundiced.   Neurological:      General: No focal deficit present.      Mental Status: He is alert and oriented to person, place, and time.   Psychiatric:         Mood and Affect: Mood normal.         Thought Content: Thought content normal.         Judgment: Judgment normal.         Diagnoses and all orders for this  visit:    1. Right upper quadrant abdominal pain (Primary)  -     NM HIDA Scan With Pharmacological Intervention; Future    2. Dyspepsia  -     NM HIDA Scan With Pharmacological Intervention; Future    3. Nausea  -     NM HIDA Scan With Pharmacological Intervention; Future    4. Biliary colic    The clinical history is suggestive of chronic cholecystitis.  I reviewed the ultrasound report and there was a notation of a gallbladder polyp. I personally reviewed the ultrasound and there is suggestion of sludge.      Plan: Will schedule HIDA scan with [facility].           If the HIDA scan is unremarkable will proceed with EGD.      Answers for HPI/ROS submitted by the patient on 1/4/2021   Abdominal pain  What is the primary reason for your visit?: Abdominal Pain

## 2021-01-22 ENCOUNTER — APPOINTMENT (OUTPATIENT)
Dept: NUCLEAR MEDICINE | Facility: HOSPITAL | Age: 61
End: 2021-01-22

## 2021-02-09 ENCOUNTER — HOSPITAL ENCOUNTER (OUTPATIENT)
Dept: NUCLEAR MEDICINE | Facility: HOSPITAL | Age: 61
Discharge: HOME | End: 2021-02-09

## 2021-02-09 VITALS — WEIGHT: 205 LBS | BODY MASS INDEX: 30.27 KG/M2

## 2021-02-09 DIAGNOSIS — R10.11 RIGHT UPPER QUADRANT ABDOMINAL PAIN: ICD-10-CM

## 2021-02-09 DIAGNOSIS — R10.13 DYSPEPSIA: ICD-10-CM

## 2021-02-09 DIAGNOSIS — R11.0 NAUSEA: ICD-10-CM

## 2021-02-09 PROCEDURE — A9537 TC99M MEBROFENIN: HCPCS | Performed by: INTERNAL MEDICINE

## 2021-02-09 PROCEDURE — 78227 HEPATOBIL SYST IMAGE W/DRUG: CPT

## 2021-02-09 PROCEDURE — 0 TECHNETIUM TC 99M MEBROFENIN KIT: Performed by: INTERNAL MEDICINE

## 2021-02-09 PROCEDURE — 25010000002 SINCALIDE PER 5 MCG: Performed by: INTERNAL MEDICINE

## 2021-02-09 RX ORDER — KIT FOR THE PREPARATION OF TECHNETIUM TC 99M MEBROFENIN 45 MG/10ML
1 INJECTION, POWDER, LYOPHILIZED, FOR SOLUTION INTRAVENOUS
Status: COMPLETED | OUTPATIENT
Start: 2021-02-09 | End: 2021-02-09

## 2021-02-09 RX ADMIN — SINCALIDE 1.9 MCG: 5 INJECTION, POWDER, LYOPHILIZED, FOR SOLUTION INTRAVENOUS at 14:00

## 2021-02-09 RX ADMIN — MEBROFENIN 1 DOSE: 45 INJECTION, POWDER, LYOPHILIZED, FOR SOLUTION INTRAVENOUS at 12:50

## 2021-02-11 ENCOUNTER — TELEPHONE (OUTPATIENT)
Dept: GASTROENTEROLOGY | Facility: CLINIC | Age: 61
End: 2021-02-11

## 2021-02-11 NOTE — TELEPHONE ENCOUNTER
I called and left a message for Mr. Fields.  The ejection fraction was 11%.  I recommend a general surgical consultation.

## 2021-02-17 ENCOUNTER — TELEMEDICINE (OUTPATIENT)
Dept: FAMILY MEDICINE CLINIC | Facility: CLINIC | Age: 61
End: 2021-02-17

## 2021-02-17 DIAGNOSIS — R10.11 POSTPRANDIAL RUQ PAIN: ICD-10-CM

## 2021-02-17 DIAGNOSIS — N52.9 ERECTILE DYSFUNCTION, UNSPECIFIED ERECTILE DYSFUNCTION TYPE: ICD-10-CM

## 2021-02-17 DIAGNOSIS — R94.8 ABNORMAL BILIARY HIDA SCAN: ICD-10-CM

## 2021-02-17 DIAGNOSIS — F41.9 ANXIETY: Primary | ICD-10-CM

## 2021-02-17 DIAGNOSIS — Z71.6 ENCOUNTER FOR SMOKING CESSATION COUNSELING: Primary | ICD-10-CM

## 2021-02-17 PROCEDURE — 99214 OFFICE O/P EST MOD 30 MIN: CPT | Performed by: FAMILY MEDICINE

## 2021-02-17 RX ORDER — TADALAFIL 5 MG/1
5 TABLET ORAL DAILY PRN
Qty: 30 TABLET | Refills: 6 | Status: SHIPPED | OUTPATIENT
Start: 2021-02-17 | End: 2021-11-26 | Stop reason: SDUPTHER

## 2021-02-17 RX ORDER — NICOTINE 21 MG/24HR
1 PATCH, TRANSDERMAL 24 HOURS TRANSDERMAL EVERY 24 HOURS
Qty: 28 PATCH | Refills: 0 | Status: SHIPPED | OUTPATIENT
Start: 2021-02-17 | End: 2022-03-09

## 2021-02-17 NOTE — ASSESSMENT & PLAN NOTE
Patient had no improvement in symptoms with Lexapro.  Will start patient on trial of Zoloft.  Follow-up in 8 weeks to assess effectiveness.

## 2021-02-17 NOTE — ASSESSMENT & PLAN NOTE
Patient did not have improvement with sildenafil and had adverse effects so he is interested in trying something different.  Patient was given prescription for Cialis.

## 2021-02-17 NOTE — PROGRESS NOTES
Alf Fields is a 61 y.o. male who presents today for Anxiety and Erectile Dysfunction    You have chosen to receive care through a telemedicine visit. Do you consent to use a telemedicine visit for your medical care today? Yes.     Patient said he was thought to have an ulcer at the time he was prescribed the pantoprazole. Patient has not had a flare up in about a month, but at the time he was having abdominal pain. Since being prescribed the pantoprazole, patient had a HIDA scan last Tuesday. Patient said the results recommended removal of the gallbladder and that Dr. Lucero would have general surgery reach out to him to set up a date for cholecystectomy. Patient does not like to take medication if not needed and wants to know if he can stop this med.    Patient has questions regarding his Lexapro dosage. Patient states he took the one tablet once a day for a couple months with no beneficial results and without side effects. Patient began taking two 10 mg tablets of the Lexapro a day a couple weeks ago because he felt the one tablet wasn't working. He states this helped some. He still has no complaints of any side effects. Patient is interested in either increasing dosage of Lexapro or beginning a new med.    Patient says the sildenafil gave him leg cramps with no relief of erectile dysfunction. Patient says he would take the 3-5 pills as prescribed. Patient states he would like to try new medication for ED that would help symptoms and not cause side effects such as leg cramps he has experienced.       The following portions of the patient's history were reviewed and updated as appropriate: allergies, current medications, past family history, past medical history, past social history, past surgical history and problem list.    Current Outpatient Medications on File Prior to Visit   Medication Sig Dispense Refill   • cyclobenzaprine (FLEXERIL) 10 MG tablet Take 1 tablet by mouth 3 (Three) Times a Day As Needed  for Muscle Spasms. 90 tablet 1   • [DISCONTINUED] escitalopram (Lexapro) 10 MG tablet Take 1 tablet by mouth Daily. 90 tablet 1   • [DISCONTINUED] pantoprazole (Protonix) 40 MG EC tablet Take 1 tablet by mouth Daily. 90 tablet 1   • [DISCONTINUED] sildenafil (REVATIO) 20 MG tablet Take 3 to 5 tabs po once daily prn ED 30 tablet 5   • [DISCONTINUED] varenicline (Chantix) 0.5 MG tablet Take 1 tablet by mouth 2 (Two) Times a Day. 60 tablet 3     No current facility-administered medications on file prior to visit.        No Known Allergies     There were no vitals taken for this visit.     Physical Exam  Constitutional:       General: He is not in acute distress.     Appearance: Normal appearance. He is not ill-appearing, toxic-appearing or diaphoretic.   Pulmonary:      Effort: Pulmonary effort is normal. No respiratory distress.   Neurological:      General: No focal deficit present.      Mental Status: He is alert. Mental status is at baseline.   Psychiatric:         Mood and Affect: Mood normal.         Behavior: Behavior normal.          Results for orders placed or performed in visit on 08/28/20   Comprehensive Metabolic Panel    Specimen: Arm, Left; Blood   Result Value Ref Range    Glucose 89 65 - 99 mg/dL    BUN 17 6 - 20 mg/dL    Creatinine 1.14 0.76 - 1.27 mg/dL    Sodium 140 136 - 145 mmol/L    Potassium 4.3 3.5 - 5.2 mmol/L    Chloride 106 98 - 107 mmol/L    CO2 24.5 22.0 - 29.0 mmol/L    Calcium 9.8 8.6 - 10.5 mg/dL    Total Protein 7.7 6.0 - 8.5 g/dL    Albumin 4.70 3.50 - 5.20 g/dL    ALT (SGPT) 15 1 - 41 U/L    AST (SGOT) 17 1 - 40 U/L    Alkaline Phosphatase 78 39 - 117 U/L    Total Bilirubin 0.4 0.0 - 1.2 mg/dL    eGFR Non African Amer 66 >60 mL/min/1.73    eGFR  African Amer 80 >60 mL/min/1.73    Globulin 3.0 gm/dL    A/G Ratio 1.6 g/dL    BUN/Creatinine Ratio 14.9 7.0 - 25.0    Anion Gap 9.5 5.0 - 15.0 mmol/L   Hemoglobin A1c    Specimen: Arm, Left; Blood   Result Value Ref Range    Hemoglobin A1C  5.40 4.80 - 5.60 %   Lipid Panel    Specimen: Arm, Left; Blood   Result Value Ref Range    Total Cholesterol 222 (H) 0 - 200 mg/dL    Triglycerides 298 (H) 0 - 150 mg/dL    HDL Cholesterol 39 (L) 40 - 60 mg/dL    LDL Cholesterol  123 (H) 0 - 100 mg/dL    VLDL Cholesterol 59.6 (H) 5 - 40 mg/dL    LDL/HDL Ratio 3.16    PSA Screen    Specimen: Arm, Left; Blood   Result Value Ref Range    PSA 0.420 0.000 - 4.000 ng/mL   TSH Rfx On Abnormal To Free T4    Specimen: Arm, Left; Blood   Result Value Ref Range    TSH 1.970 0.270 - 4.200 uIU/mL   CBC Auto Differential    Specimen: Arm, Left; Blood   Result Value Ref Range    WBC 7.49 3.40 - 10.80 10*3/mm3    RBC 5.21 4.14 - 5.80 10*6/mm3    Hemoglobin 15.8 13.0 - 17.7 g/dL    Hematocrit 48.0 37.5 - 51.0 %    MCV 92.1 79.0 - 97.0 fL    MCH 30.3 26.6 - 33.0 pg    MCHC 32.9 31.5 - 35.7 g/dL    RDW 13.2 12.3 - 15.4 %    RDW-SD 45.3 37.0 - 54.0 fl    MPV 12.2 (H) 6.0 - 12.0 fL    Platelets 226 140 - 450 10*3/mm3    Neutrophil % 57.4 42.7 - 76.0 %    Lymphocyte % 33.5 19.6 - 45.3 %    Monocyte % 6.3 5.0 - 12.0 %    Eosinophil % 1.7 0.3 - 6.2 %    Basophil % 0.8 0.0 - 1.5 %    Immature Grans % 0.3 0.0 - 0.5 %    Neutrophils, Absolute 4.30 1.70 - 7.00 10*3/mm3    Lymphocytes, Absolute 2.51 0.70 - 3.10 10*3/mm3    Monocytes, Absolute 0.47 0.10 - 0.90 10*3/mm3    Eosinophils, Absolute 0.13 0.00 - 0.40 10*3/mm3    Basophils, Absolute 0.06 0.00 - 0.20 10*3/mm3    Immature Grans, Absolute 0.02 0.00 - 0.05 10*3/mm3    nRBC 0.0 0.0 - 0.2 /100 WBC        Problems Addressed this Visit        Gastrointestinal Abdominal     Postprandial RUQ pain     Patient was seen by GI and has abnormal HIDA scan.  They recommend referral to surgery for possible gallbladder removal.  Surgery referral was placed today.         Relevant Orders    Ambulatory Referral to General Surgery       Genitourinary and Reproductive     Erectile dysfunction     Patient did not have improvement with sildenafil and had  adverse effects so he is interested in trying something different.  Patient was given prescription for Cialis.         Relevant Medications    tadalafil (Cialis) 5 MG tablet       Mental Health    Anxiety - Primary     Patient had no improvement in symptoms with Lexapro.  Will start patient on trial of Zoloft.  Follow-up in 8 weeks to assess effectiveness.         Relevant Medications    sertraline (ZOLOFT) 50 MG tablet      Other Visit Diagnoses     Abnormal biliary HIDA scan        Relevant Orders    Ambulatory Referral to General Surgery      Diagnoses       Codes Comments    Anxiety    -  Primary ICD-10-CM: F41.9  ICD-9-CM: 300.00     Postprandial RUQ pain     ICD-10-CM: R10.11  ICD-9-CM: 789.01     Erectile dysfunction, unspecified erectile dysfunction type     ICD-10-CM: N52.9  ICD-9-CM: 607.84     Abnormal biliary HIDA scan     ICD-10-CM: R94.8  ICD-9-CM: 794.9           Return in about 8 weeks (around 4/14/2021) for Follow-up anxiety.    Parts of this office note have been dictated by voice recognition software. Grammatical and/or spelling errors may be present. This was an audio and video enabled telemedicine encounter.      Jovi Kelley MD   2/17/2021

## 2021-02-17 NOTE — ASSESSMENT & PLAN NOTE
Patient was seen by GI and has abnormal HIDA scan.  They recommend referral to surgery for possible gallbladder removal.  Surgery referral was placed today.

## 2021-02-18 DIAGNOSIS — R10.11 RIGHT UPPER QUADRANT ABDOMINAL PAIN: Primary | ICD-10-CM

## 2021-02-18 DIAGNOSIS — R94.8 ABNORMAL BILIARY HIDA SCAN: ICD-10-CM

## 2021-03-22 ENCOUNTER — APPOINTMENT (OUTPATIENT)
Dept: PREADMISSION TESTING | Facility: HOSPITAL | Age: 61
End: 2021-03-22

## 2021-04-05 ENCOUNTER — APPOINTMENT (OUTPATIENT)
Dept: PREADMISSION TESTING | Facility: HOSPITAL | Age: 61
End: 2021-04-05

## 2021-04-05 PROCEDURE — C9803 HOPD COVID-19 SPEC COLLECT: HCPCS

## 2021-04-05 PROCEDURE — U0004 COV-19 TEST NON-CDC HGH THRU: HCPCS

## 2021-04-06 LAB — SARS-COV-2 RNA NOSE QL NAA+PROBE: NOT DETECTED

## 2021-04-08 ENCOUNTER — LAB REQUISITION (OUTPATIENT)
Dept: LAB | Facility: HOSPITAL | Age: 61
End: 2021-04-08

## 2021-04-08 DIAGNOSIS — K82.8 OTHER SPECIFIED DISEASES OF GALLBLADDER: ICD-10-CM

## 2021-04-08 PROCEDURE — 88304 TISSUE EXAM BY PATHOLOGIST: CPT | Performed by: SURGERY

## 2021-11-26 DIAGNOSIS — N52.9 ERECTILE DYSFUNCTION, UNSPECIFIED ERECTILE DYSFUNCTION TYPE: ICD-10-CM

## 2021-11-29 RX ORDER — TADALAFIL 5 MG/1
5 TABLET ORAL DAILY PRN
Qty: 30 TABLET | Refills: 6 | Status: SHIPPED | OUTPATIENT
Start: 2021-11-29 | End: 2022-03-09 | Stop reason: SDUPTHER

## 2022-03-09 ENCOUNTER — LAB (OUTPATIENT)
Dept: LAB | Facility: HOSPITAL | Age: 62
End: 2022-03-09

## 2022-03-09 ENCOUNTER — OFFICE VISIT (OUTPATIENT)
Dept: FAMILY MEDICINE CLINIC | Facility: CLINIC | Age: 62
End: 2022-03-09

## 2022-03-09 VITALS
OXYGEN SATURATION: 99 % | WEIGHT: 197 LBS | HEART RATE: 72 BPM | DIASTOLIC BLOOD PRESSURE: 90 MMHG | BODY MASS INDEX: 29.18 KG/M2 | SYSTOLIC BLOOD PRESSURE: 120 MMHG | TEMPERATURE: 97.8 F | HEIGHT: 69 IN

## 2022-03-09 DIAGNOSIS — Z00.00 WELL ADULT EXAM: Primary | ICD-10-CM

## 2022-03-09 DIAGNOSIS — F41.9 ANXIETY: ICD-10-CM

## 2022-03-09 DIAGNOSIS — N52.9 ERECTILE DYSFUNCTION, UNSPECIFIED ERECTILE DYSFUNCTION TYPE: ICD-10-CM

## 2022-03-09 DIAGNOSIS — F17.210 SMOKING GREATER THAN 30 PACK YEARS: ICD-10-CM

## 2022-03-09 DIAGNOSIS — Z12.5 SCREENING FOR PROSTATE CANCER: ICD-10-CM

## 2022-03-09 PROCEDURE — 99213 OFFICE O/P EST LOW 20 MIN: CPT | Performed by: FAMILY MEDICINE

## 2022-03-09 PROCEDURE — 90750 HZV VACC RECOMBINANT IM: CPT | Performed by: FAMILY MEDICINE

## 2022-03-09 PROCEDURE — 83036 HEMOGLOBIN GLYCOSYLATED A1C: CPT | Performed by: FAMILY MEDICINE

## 2022-03-09 PROCEDURE — 90471 IMMUNIZATION ADMIN: CPT | Performed by: FAMILY MEDICINE

## 2022-03-09 PROCEDURE — 80053 COMPREHEN METABOLIC PANEL: CPT | Performed by: FAMILY MEDICINE

## 2022-03-09 PROCEDURE — 80061 LIPID PANEL: CPT | Performed by: FAMILY MEDICINE

## 2022-03-09 PROCEDURE — 85025 COMPLETE CBC W/AUTO DIFF WBC: CPT | Performed by: FAMILY MEDICINE

## 2022-03-09 PROCEDURE — 84443 ASSAY THYROID STIM HORMONE: CPT | Performed by: FAMILY MEDICINE

## 2022-03-09 PROCEDURE — 90472 IMMUNIZATION ADMIN EACH ADD: CPT | Performed by: FAMILY MEDICINE

## 2022-03-09 PROCEDURE — 90732 PPSV23 VACC 2 YRS+ SUBQ/IM: CPT | Performed by: FAMILY MEDICINE

## 2022-03-09 PROCEDURE — 99396 PREV VISIT EST AGE 40-64: CPT | Performed by: FAMILY MEDICINE

## 2022-03-09 PROCEDURE — G0103 PSA SCREENING: HCPCS | Performed by: FAMILY MEDICINE

## 2022-03-09 RX ORDER — TADALAFIL 5 MG/1
5 TABLET ORAL DAILY PRN
Qty: 30 TABLET | Refills: 6 | Status: SHIPPED | OUTPATIENT
Start: 2022-03-09

## 2022-03-09 RX ORDER — ESCITALOPRAM OXALATE 20 MG/1
20 TABLET ORAL DAILY
Qty: 30 TABLET | Refills: 5 | Status: SHIPPED | OUTPATIENT
Start: 2022-03-09 | End: 2022-04-09 | Stop reason: SDUPTHER

## 2022-03-09 NOTE — PROGRESS NOTES
Alf Fields is a 62 y.o. male who presents today to complete annual exam.    Chief Complaint   Patient presents with   • Annual Exam        Mr. Fields is 62 year old is here for physical. He states he is doing well. Diet is limited d/t waiting for teeth implants. He is eating mostly soft foods at home. His activity includes walking 1-2 miles nightly. No sleep issues. He gets 6 hours a night. Patient is interested in getting the pneumococcal and Zoster vaccines today. He is up to date on other vaccines and screening colonoscopy.He is up to date with optometry and dentist. He does not see dermatology.  He is a current smoker down for 20 years down to 0.5 pack from 1.5 packs daily. He is working on cessation and working on setting an end date. He has failed chantix and NRT patch in the past. He is agreeable to low CT screening. He denies hemoptysis, chronic cough, SOA, or WOLF    His only complaint is his anxiety. NELDA-7 score of 11. He states he took the Lexapro for several months, more than 6 weeks and he felt no difference. He has also tried Zoloft and wellbutrin and failed. HE states he is willing to increase his dosage of lexapro because he had no side effects or try a new medication. He does not see counselor. He does not want a referral to behavioral health at this time. He wants to pursue medical therapy only.     COVID vaccine was on 3/3/21, 4/2/21. He states he has gotten a booster but is unsure of the date  He needs refills on Cialis       Review of Systems   Constitutional: Negative for chills and fever.   HENT: Positive for dental problem (waiting teeth implants).    Eyes: Negative for blurred vision and double vision.   Respiratory: Negative for shortness of breath.    Cardiovascular: Negative for chest pain and palpitations.   Gastrointestinal: Negative for abdominal pain, blood in stool, constipation, diarrhea, nausea and vomiting.   Genitourinary: Negative for dysuria and erectile dysfunction.    Neurological: Negative for seizures and syncope.   Psychiatric/Behavioral: Negative for suicidal ideas and depressed mood. The patient is nervous/anxious.       NELDA-7  Over the last two weeks, how often have you been bothered by the following problems?  Feeling nervous, anxious or on edge: 2  Not being able to stop or control worrying: 3  Worrying too much about different things: 3  Trouble Relaxin  Being so restless that it is hard to sit still: 0  Becoming easily annoyed or irritable: 1  Feeling afraid as if something awful might happen: 0  NELDA 7 Total Score: 11  If you checked any problems, how difficult have these problems made it for you to do your work, take care of things at home, or get along with other people: Very difficult      PHQ-9 Depression Screening  Little interest or pleasure in doing things? 0-->not at all   Feeling down, depressed, or hopeless? 0-->not at all   Trouble falling or staying asleep, or sleeping too much?     Feeling tired or having little energy?     Poor appetite or overeating?     Feeling bad about yourself - or that you are a failure or have let yourself or your family down?     Trouble concentrating on things, such as reading the newspaper or watching television?     Moving or speaking so slowly that other people could have noticed? Or the opposite - being so fidgety or restless that you have been moving around a lot more than usual?     Thoughts that you would be better off dead, or of hurting yourself in some way?     PHQ-9 Total Score 0   If you checked off any problems, how difficult have these problems made it for you to do your work, take care of things at home, or get along with other people?         Past Medical History:   Diagnosis Date   • Anxiety     Comes and goes with stress levels   • Degenerative joint disease (DJD) of lumbar spine    • Erectile dysfunction 2018    Can't afford the meds        Past Surgical History:   Procedure Laterality Date   • ANKLE  "ARTHROSCOPY Left 2010   • CHOLECYSTECTOMY     • COLONOSCOPY  2018        Family History   Problem Relation Age of Onset   • Other Mother         unknown   • Drug abuse Mother         Heroin   • Alcohol abuse Sister    • No Known Problems Daughter    • No Known Problems Son    • Cataracts Maternal Grandmother    • Arthritis Maternal Grandmother    • Other Maternal Grandfather         Unknown   • No Known Problems Daughter    • No Known Problems Son    • Early death Maternal Aunt         Hodgkins was 22 years old        Social History     Socioeconomic History   • Marital status:      Spouse name: Arely   • Number of children: 2   • Years of education: H.S.   Tobacco Use   • Smoking status: Heavy Tobacco Smoker     Packs/day: 1.50     Years: 20.00     Pack years: 30.00     Types: Cigarettes   • Smokeless tobacco: Never Used   • Tobacco comment: Currently down to 5 cig per day   Substance and Sexual Activity   • Alcohol use: No   • Drug use: No   • Sexual activity: Yes     Partners: Female     Birth control/protection: None        Current Outpatient Medications on File Prior to Visit   Medication Sig Dispense Refill   • cyclobenzaprine (FLEXERIL) 10 MG tablet Take 1 tablet by mouth 3 (Three) Times a Day As Needed for Muscle Spasms. 90 tablet 1     No current facility-administered medications on file prior to visit.       No Known Allergies     Visit Vitals  /90   Pulse 72   Temp 97.8 °F (36.6 °C)   Ht 175.3 cm (69\")   Wt 89.4 kg (197 lb)   SpO2 99%   BMI 29.09 kg/m²      Body mass index is 29.09 kg/m².    Physical Exam  Constitutional:       Appearance: Normal appearance.   HENT:      Right Ear: Tympanic membrane, ear canal and external ear normal.      Left Ear: Tympanic membrane, ear canal and external ear normal.   Cardiovascular:      Rate and Rhythm: Normal rate and regular rhythm.      Heart sounds: Normal heart sounds. No murmur heard.    No friction rub. No gallop.   Pulmonary:      Effort: Pulmonary " effort is normal.      Breath sounds: Normal breath sounds. No wheezing, rhonchi or rales.   Abdominal:      General: Bowel sounds are normal. There is no distension.      Palpations: Abdomen is soft. There is no mass.      Tenderness: There is no abdominal tenderness.   Neurological:      Mental Status: He is alert and oriented to person, place, and time.   Psychiatric:         Mood and Affect: Mood normal.               Problems Addressed this Visit        Genitourinary and Reproductive     Erectile dysfunction    Relevant Medications    tadalafil (Cialis) 5 MG tablet    Screening for prostate cancer    Relevant Orders    PSA Screen (Completed)       Health Encounters    Well adult exam - Primary     The patient is here for health maintenance visit.  Currently, the patient consumes a healthy diet and has an adequate exercise regimen.  Screening lab work is ordered.  Immunizations were reviewed today.  Advice and education was given regarding nutrition, aerobic exercise, routine dental evaluations, routine eye exams, reproductive health, cardiovascular risk reduction, sunscreen use, self skin examination (annual dermatology evaluations) and seatbelt use (general overall safety).  Further recommendations will be given if needed after lab evaluation.  Annual wellness evaluation is recommended.             Relevant Orders    Pneumococcal Polysaccharide Vaccine 23-Valent Greater Than or Equal To 1yo Subcutaneous / IM (Completed)    Shingrix Vaccine (Completed)    CBC & Differential (Completed)    Comprehensive Metabolic Panel (Completed)    Hemoglobin A1c (Completed)    Lipid Panel (Completed)    TSH Rfx On Abnormal To Free T4 (Completed)       Mental Health    Anxiety     Chronic and unchanged.  Will increase Lexapro to 20 mg daily.           Relevant Medications    escitalopram (Lexapro) 20 MG tablet       Tobacco    Smoking greater than 30 pack years    Relevant Orders     CT Chest Low Dose Cancer Screening WO       Diagnoses       Codes Comments    Well adult exam    -  Primary ICD-10-CM: Z00.00  ICD-9-CM: V70.0     Anxiety     ICD-10-CM: F41.9  ICD-9-CM: 300.00     Smoking greater than 30 pack years     ICD-10-CM: F17.210  ICD-9-CM: 305.1     Screening for prostate cancer     ICD-10-CM: Z12.5  ICD-9-CM: V76.44     Erectile dysfunction, unspecified erectile dysfunction type     ICD-10-CM: N52.9  ICD-9-CM: 607.84           Return in about 2 months (around 5/9/2022) for  , Follow-up anxiety.    I attest that I have reviewed the student note and that the components of the history of the present illness, the physical exam, and the assessment and plan documented were performed by me or were performed in my presence by the student and verified by me.    Jovi Kelley MD  3/10/2022

## 2022-03-09 NOTE — PATIENT INSTRUCTIONS
Smoking Tobacco Information, Adult  Smoking tobacco can be harmful to your health. Tobacco contains a poisonous (toxic), colorless chemical called nicotine. Nicotine is addictive. It changes the brain and can make it hard to stop smoking. Tobacco also has other toxic chemicals that can hurt your body and raise your risk of many cancers.  How can smoking tobacco affect me?  Smoking tobacco puts you at risk for:  Cancer. Smoking is most commonly associated with lung cancer, but can also lead to cancer in other parts of the body.  Chronic obstructive pulmonary disease (COPD). This is a long-term lung condition that makes it hard to breathe. It also gets worse over time.  High blood pressure (hypertension), heart disease, stroke, or heart attack.  Lung infections, such as pneumonia.  Cataracts. This is when the lenses in the eyes become clouded.  Digestive problems. This may include peptic ulcers, heartburn, and gastroesophageal reflux disease (GERD).  Oral health problems, such as gum disease and tooth loss.  Loss of taste and smell.  Smoking can affect your appearance by causing:  Wrinkles.  Yellow or stained teeth, fingers, and fingernails.  Smoking tobacco can also affect your social life, because:  It may be challenging to find places to smoke when away from home. Many workplaces, restaurants, hotels, and public places are tobacco-free.  Smoking is expensive. This is due to the cost of tobacco and the long-term costs of treating health problems from smoking.  Secondhand smoke may affect those around you. Secondhand smoke can cause lung cancer, breathing problems, and heart disease. Children of smokers have a higher risk for:  Sudden infant death syndrome (SIDS).  Ear infections.  Lung infections.  If you currently smoke tobacco, quitting now can help you:  Lead a longer and healthier life.  Look, smell, breathe, and feel better over time.  Save money.  Protect others from the harms of secondhand smoke.  What  actions can I take to prevent health problems?  Quit smoking    Do not start smoking. Quit if you already do.  Make a plan to quit smoking and commit to it. Look for programs to help you and ask your health care provider for recommendations and ideas.  Set a date and write down all the reasons you want to quit.  Let your friends and family know you are quitting so they can help and support you. Consider finding friends who also want to quit. It can be easier to quit with someone else, so that you can support each other.  Talk with your health care provider about using nicotine replacement medicines to help you quit, such as gum, lozenges, patches, sprays, or pills.  Do not replace cigarette smoking with electronic cigarettes, which are commonly called e-cigarettes. The safety of e-cigarettes is not known, and some may contain harmful chemicals.  If you try to quit but return to smoking, stay positive. It is common to slip up when you first quit, so take it one day at a time.  Be prepared for cravings. When you feel the urge to smoke, chew gum or suck on hard candy.    Lifestyle  Stay busy and take care of your body.  Drink enough fluid to keep your urine pale yellow.  Get plenty of exercise and eat a healthy diet. This can help prevent weight gain after quitting.  Monitor your eating habits. Quitting smoking can cause you to have a larger appetite than when you smoke.  Find ways to relax. Go out with friends or family to a movie or a restaurant where people do not smoke.  Ask your health care provider about having regular tests (screenings) to check for cancer. This may include blood tests, imaging tests, and other tests.  Find ways to manage your stress, such as meditation, yoga, or exercise.  Where to find support  To get support to quit smoking, consider:  Asking your health care provider for more information and resources.  Taking classes to learn more about quitting smoking.  Looking for local organizations  that offer resources about quitting smoking.  Joining a support group for people who want to quit smoking in your local community.  Calling the smokefree.gov counselor helpline: 1-800-Quit-Now (1-704.729.9284)  Where to find more information  You may find more information about quitting smoking from:  HelpGuide.org: www.helpguide.org  Smokefree.gov: smokefree.gov  American Lung Association: www.lung.org  Contact a health care provider if you:  Have problems breathing.  Notice that your lips, nose, or fingers turn blue.  Have chest pain.  Are coughing up blood.  Feel faint or you pass out.  Have other health changes that cause you to worry.  Summary  Smoking tobacco can negatively affect your health, the health of those around you, your finances, and your social life.  Do not start smoking. Quit if you already do. If you need help quitting, ask your health care provider.  Think about joining a support group for people who want to quit smoking in your local community. There are many effective programs that will help you to quit this behavior.  This information is not intended to replace advice given to you by your health care provider. Make sure you discuss any questions you have with your health care provider.  Document Revised: 09/11/2020 Document Reviewed: 01/02/2018  Elsevier Patient Education © 2021 [employer] Inc.

## 2022-03-10 PROBLEM — Z12.5 SCREENING FOR PROSTATE CANCER: Status: ACTIVE | Noted: 2022-03-10

## 2022-03-10 PROBLEM — F17.210 SMOKING GREATER THAN 30 PACK YEARS: Status: ACTIVE | Noted: 2022-03-10

## 2022-03-10 LAB
ALBUMIN SERPL-MCNC: 4.6 G/DL (ref 3.5–5.2)
ALBUMIN/GLOB SERPL: 1.6 G/DL
ALP SERPL-CCNC: 92 U/L (ref 39–117)
ALT SERPL W P-5'-P-CCNC: 22 U/L (ref 1–41)
ANION GAP SERPL CALCULATED.3IONS-SCNC: 11.3 MMOL/L (ref 5–15)
AST SERPL-CCNC: 21 U/L (ref 1–40)
BASOPHILS # BLD AUTO: 0.06 10*3/MM3 (ref 0–0.2)
BASOPHILS NFR BLD AUTO: 0.7 % (ref 0–1.5)
BILIRUB SERPL-MCNC: 0.5 MG/DL (ref 0–1.2)
BUN SERPL-MCNC: 7 MG/DL (ref 8–23)
BUN/CREAT SERPL: 7 (ref 7–25)
CALCIUM SPEC-SCNC: 9.6 MG/DL (ref 8.6–10.5)
CHLORIDE SERPL-SCNC: 103 MMOL/L (ref 98–107)
CHOLEST SERPL-MCNC: 225 MG/DL (ref 0–200)
CO2 SERPL-SCNC: 24.7 MMOL/L (ref 22–29)
CREAT SERPL-MCNC: 1 MG/DL (ref 0.76–1.27)
DEPRECATED RDW RBC AUTO: 40.5 FL (ref 37–54)
EGFRCR SERPLBLD CKD-EPI 2021: 86.2 ML/MIN/1.73
EOSINOPHIL # BLD AUTO: 0.11 10*3/MM3 (ref 0–0.4)
EOSINOPHIL NFR BLD AUTO: 1.3 % (ref 0.3–6.2)
ERYTHROCYTE [DISTWIDTH] IN BLOOD BY AUTOMATED COUNT: 12.6 % (ref 12.3–15.4)
GLOBULIN UR ELPH-MCNC: 2.9 GM/DL
GLUCOSE SERPL-MCNC: 90 MG/DL (ref 65–99)
HBA1C MFR BLD: 5.7 % (ref 4.8–5.6)
HCT VFR BLD AUTO: 47.9 % (ref 37.5–51)
HDLC SERPL-MCNC: 38 MG/DL (ref 40–60)
HGB BLD-MCNC: 16.7 G/DL (ref 13–17.7)
IMM GRANULOCYTES # BLD AUTO: 0.03 10*3/MM3 (ref 0–0.05)
IMM GRANULOCYTES NFR BLD AUTO: 0.3 % (ref 0–0.5)
LDLC SERPL CALC-MCNC: 146 MG/DL (ref 0–100)
LDLC/HDLC SERPL: 3.75 {RATIO}
LYMPHOCYTES # BLD AUTO: 3.19 10*3/MM3 (ref 0.7–3.1)
LYMPHOCYTES NFR BLD AUTO: 36.8 % (ref 19.6–45.3)
MCH RBC QN AUTO: 31 PG (ref 26.6–33)
MCHC RBC AUTO-ENTMCNC: 34.9 G/DL (ref 31.5–35.7)
MCV RBC AUTO: 88.9 FL (ref 79–97)
MONOCYTES # BLD AUTO: 0.43 10*3/MM3 (ref 0.1–0.9)
MONOCYTES NFR BLD AUTO: 5 % (ref 5–12)
NEUTROPHILS NFR BLD AUTO: 4.86 10*3/MM3 (ref 1.7–7)
NEUTROPHILS NFR BLD AUTO: 55.9 % (ref 42.7–76)
NRBC BLD AUTO-RTO: 0 /100 WBC (ref 0–0.2)
PLATELET # BLD AUTO: 235 10*3/MM3 (ref 140–450)
PMV BLD AUTO: 11.8 FL (ref 6–12)
POTASSIUM SERPL-SCNC: 4.4 MMOL/L (ref 3.5–5.2)
PROT SERPL-MCNC: 7.5 G/DL (ref 6–8.5)
PSA SERPL-MCNC: 0.48 NG/ML (ref 0–4)
RBC # BLD AUTO: 5.39 10*6/MM3 (ref 4.14–5.8)
SODIUM SERPL-SCNC: 139 MMOL/L (ref 136–145)
TRIGL SERPL-MCNC: 223 MG/DL (ref 0–150)
TSH SERPL DL<=0.05 MIU/L-ACNC: 1.21 UIU/ML (ref 0.27–4.2)
VLDLC SERPL-MCNC: 41 MG/DL (ref 5–40)
WBC NRBC COR # BLD: 8.68 10*3/MM3 (ref 3.4–10.8)

## 2022-04-09 DIAGNOSIS — F41.9 ANXIETY: ICD-10-CM

## 2022-04-09 RX ORDER — ESCITALOPRAM OXALATE 20 MG/1
20 TABLET ORAL DAILY
Qty: 30 TABLET | Refills: 5 | Status: SHIPPED | OUTPATIENT
Start: 2022-04-09

## 2024-10-01 ENCOUNTER — LAB (OUTPATIENT)
Dept: LAB | Facility: HOSPITAL | Age: 64
End: 2024-10-01
Payer: COMMERCIAL

## 2024-10-01 ENCOUNTER — OFFICE VISIT (OUTPATIENT)
Dept: FAMILY MEDICINE CLINIC | Facility: CLINIC | Age: 64
End: 2024-10-01
Payer: COMMERCIAL

## 2024-10-01 VITALS
BODY MASS INDEX: 30.9 KG/M2 | TEMPERATURE: 98.9 F | HEIGHT: 69 IN | HEART RATE: 64 BPM | OXYGEN SATURATION: 98 % | WEIGHT: 208.6 LBS | SYSTOLIC BLOOD PRESSURE: 122 MMHG | DIASTOLIC BLOOD PRESSURE: 80 MMHG

## 2024-10-01 DIAGNOSIS — N52.9 ERECTILE DYSFUNCTION, UNSPECIFIED ERECTILE DYSFUNCTION TYPE: ICD-10-CM

## 2024-10-01 DIAGNOSIS — G89.29 CHRONIC RIGHT-SIDED LOW BACK PAIN WITH RIGHT-SIDED SCIATICA: Primary | ICD-10-CM

## 2024-10-01 DIAGNOSIS — Z79.1 NSAID LONG-TERM USE: ICD-10-CM

## 2024-10-01 DIAGNOSIS — M54.41 CHRONIC RIGHT-SIDED LOW BACK PAIN WITH RIGHT-SIDED SCIATICA: Primary | ICD-10-CM

## 2024-10-01 LAB
ANION GAP SERPL CALCULATED.3IONS-SCNC: 10 MMOL/L (ref 5–15)
BUN SERPL-MCNC: 16 MG/DL (ref 8–23)
BUN/CREAT SERPL: 15.8 (ref 7–25)
CALCIUM SPEC-SCNC: 9.4 MG/DL (ref 8.6–10.5)
CHLORIDE SERPL-SCNC: 103 MMOL/L (ref 98–107)
CO2 SERPL-SCNC: 23 MMOL/L (ref 22–29)
CREAT SERPL-MCNC: 1.01 MG/DL (ref 0.76–1.27)
EGFRCR SERPLBLD CKD-EPI 2021: 84.1 ML/MIN/1.73
GLUCOSE SERPL-MCNC: 86 MG/DL (ref 65–99)
POTASSIUM SERPL-SCNC: 4.3 MMOL/L (ref 3.5–5.2)
SODIUM SERPL-SCNC: 136 MMOL/L (ref 136–145)

## 2024-10-01 PROCEDURE — 36415 COLL VENOUS BLD VENIPUNCTURE: CPT

## 2024-10-01 PROCEDURE — 80048 BASIC METABOLIC PNL TOTAL CA: CPT

## 2024-10-01 PROCEDURE — 99214 OFFICE O/P EST MOD 30 MIN: CPT | Performed by: FAMILY MEDICINE

## 2024-10-01 RX ORDER — TADALAFIL 5 MG/1
5 TABLET ORAL DAILY PRN
Qty: 30 TABLET | Refills: 6 | Status: SHIPPED | OUTPATIENT
Start: 2024-10-01

## 2024-10-01 RX ORDER — BENZONATATE 100 MG/1
CAPSULE ORAL
COMMUNITY
Start: 2024-07-10 | End: 2024-10-01

## 2024-10-01 RX ORDER — FLUTICASONE PROPIONATE 50 UG/1
SPRAY, METERED NASAL
COMMUNITY
Start: 2024-07-10 | End: 2024-10-01

## 2024-10-01 RX ORDER — METHOCARBAMOL 750 MG/1
750 TABLET, FILM COATED ORAL 4 TIMES DAILY PRN
Qty: 120 TABLET | Refills: 1 | Status: SHIPPED | OUTPATIENT
Start: 2024-10-01

## 2024-10-01 NOTE — ASSESSMENT & PLAN NOTE
Worsening of chronic low back pain.  Patient had worsening of symptoms about a month ago with no injury or change in activity level.  He has had some relief of symptoms although it has been short-lived with ibuprofen and ice and heat.  Patient will continue the supportive measures at home.  Will check kidney function today as patient has been using ibuprofen chronically.  Patient can take 600 mg of ibuprofen in combination with 500 mg of Tylenol for pain relief.  He was given a prescription for methocarbamol for muscle spasms.  We discussed imaging and physical therapy but patient would like to hold off at this time.  Patient will follow-up in 8 weeks.  RTC/ED precautions given.

## 2024-10-01 NOTE — PROGRESS NOTES
Alf Fields is a 64 y.o. male who presents today for Sciatica Pain and Tingling (In legs)      Patient has right sided low back pain with sciatica down to the right ankle. He has some tingling in the right foot as well. This has been going on for about a month. He has chronic low back pain form DDD. He did not have injury or change in activity level when symptoms began. He states the symptoms have stayed about the same. He has been using ibuprofen 800mg every 6 hours. He has also been using ice and heat. He states these take the edge off but does not revolve the pain. He has noticed that when he walks the dogs pain worsens. He states pain improves when he lays down flat. He denies weakness, saddle anesthesia, and incontinence. He had imaging on his back years ago before moving to [place] but this is not available. He needs refill on cialias which he uses as needed for ED.             Review of Systems   Constitutional:  Negative for fever and unexpected weight loss.   HENT:  Negative for congestion, ear pain and sore throat.    Eyes:  Negative for visual disturbance.   Respiratory:  Negative for cough, shortness of breath and wheezing.    Cardiovascular:  Negative for chest pain and palpitations.   Gastrointestinal:  Negative for abdominal pain, blood in stool, constipation, diarrhea, nausea, vomiting and GERD.   Endocrine: Negative for polydipsia and polyuria.   Genitourinary:  Negative for difficulty urinating and urinary incontinence.   Musculoskeletal:  Positive for back pain. Negative for joint swelling.   Skin:  Negative for rash and skin lesions.   Allergic/Immunologic: Negative for environmental allergies.   Neurological:  Positive for numbness (tingling in right foot). Negative for seizures and syncope.   Hematological:  Does not bruise/bleed easily.   Psychiatric/Behavioral:  Negative for suicidal ideas.         The following portions of the patient's history were reviewed and updated as appropriate:  "allergies, current medications, past family history, past medical history, past social history, past surgical history and problem list.    Current Outpatient Medications on File Prior to Visit   Medication Sig Dispense Refill    [DISCONTINUED] amoxicillin-clavulanate (AUGMENTIN) 875-125 MG per tablet Take 1 tablet by mouth Every 12 (Twelve) Hours. (Patient not taking: Reported on 10/1/2024)      [DISCONTINUED] benzonatate (TESSALON) 100 MG capsule TAKE 1 CAPSULE BY MOUTH THREE TIMES DAILY FOR 7 DAYS AS NEEDED FOR COUGH (Patient not taking: Reported on 10/1/2024)      [DISCONTINUED] cyclobenzaprine (FLEXERIL) 10 MG tablet Take 1 tablet by mouth 3 (Three) Times a Day As Needed for Muscle Spasms. (Patient not taking: Reported on 10/1/2024) 90 tablet 1    [DISCONTINUED] escitalopram (Lexapro) 20 MG tablet Take 1 tablet by mouth Daily. (Patient not taking: Reported on 10/1/2024) 30 tablet 5    [DISCONTINUED] fluticasone (FLONASE) 50 MCG/ACT nasal spray SHAKE LIQUID AND USE 2 SPRAYS IN EACH NOSTRIL DAILY FOR 10 DAYS AS NEEDED FOR CONGESTION (Patient not taking: Reported on 10/1/2024)      [DISCONTINUED] tadalafil (Cialis) 5 MG tablet Take 1 tablet by mouth Daily As Needed for Erectile Dysfunction. (Patient not taking: Reported on 10/1/2024) 30 tablet 6     No current facility-administered medications on file prior to visit.       No Known Allergies     Visit Vitals  /80 (BP Location: Left arm, Patient Position: Sitting, Cuff Size: Adult)   Pulse 64   Temp 98.9 °F (37.2 °C) (Infrared)   Ht 175.3 cm (69\")   Wt 94.6 kg (208 lb 9.6 oz)   SpO2 98%   BMI 30.80 kg/m²        Physical Exam  Constitutional:       General: He is not in acute distress.     Appearance: He is well-developed. He is not diaphoretic.   HENT:      Head: Atraumatic.   Cardiovascular:      Rate and Rhythm: Normal rate and regular rhythm.      Heart sounds: Normal heart sounds. No murmur heard.     No friction rub. No gallop.   Pulmonary:      Effort: " Pulmonary effort is normal. No respiratory distress.      Breath sounds: Normal breath sounds. No stridor. No wheezing, rhonchi or rales.   Musculoskeletal:      Cervical back: Normal range of motion and neck supple.      Lumbar back: Spasms (right sided paraspinal muscles) and tenderness (right sided paraspinal muscles) present. No swelling, edema, deformity, signs of trauma, lacerations or bony tenderness. Normal range of motion. Negative right straight leg raise test and negative left straight leg raise test. No scoliosis.   Skin:     General: Skin is warm and dry.   Neurological:      Mental Status: He is alert and oriented to person, place, and time.   Psychiatric:         Behavior: Behavior normal.          Results for orders placed or performed in visit on 03/09/22   Comprehensive Metabolic Panel    Specimen: Blood   Result Value Ref Range    Glucose 90 65 - 99 mg/dL    BUN 7 (L) 8 - 23 mg/dL    Creatinine 1.00 0.76 - 1.27 mg/dL    Sodium 139 136 - 145 mmol/L    Potassium 4.4 3.5 - 5.2 mmol/L    Chloride 103 98 - 107 mmol/L    CO2 24.7 22.0 - 29.0 mmol/L    Calcium 9.6 8.6 - 10.5 mg/dL    Total Protein 7.5 6.0 - 8.5 g/dL    Albumin 4.60 3.50 - 5.20 g/dL    ALT (SGPT) 22 1 - 41 U/L    AST (SGOT) 21 1 - 40 U/L    Alkaline Phosphatase 92 39 - 117 U/L    Total Bilirubin 0.5 0.0 - 1.2 mg/dL    Globulin 2.9 gm/dL    A/G Ratio 1.6 g/dL    BUN/Creatinine Ratio 7.0 7.0 - 25.0    Anion Gap 11.3 5.0 - 15.0 mmol/L    eGFR 86.2 >60.0 mL/min/1.73   Hemoglobin A1c    Specimen: Blood   Result Value Ref Range    Hemoglobin A1C 5.70 (H) 4.80 - 5.60 %   Lipid Panel    Specimen: Blood   Result Value Ref Range    Total Cholesterol 225 (H) 0 - 200 mg/dL    Triglycerides 223 (H) 0 - 150 mg/dL    HDL Cholesterol 38 (L) 40 - 60 mg/dL    LDL Cholesterol  146 (H) 0 - 100 mg/dL    VLDL Cholesterol 41 (H) 5 - 40 mg/dL    LDL/HDL Ratio 3.75    PSA Screen    Specimen: Blood   Result Value Ref Range    PSA 0.481 0.000 - 4.000 ng/mL   TSH  Rfx On Abnormal To Free T4    Specimen: Blood   Result Value Ref Range    TSH 1.210 0.270 - 4.200 uIU/mL   CBC Auto Differential    Specimen: Blood   Result Value Ref Range    WBC 8.68 3.40 - 10.80 10*3/mm3    RBC 5.39 4.14 - 5.80 10*6/mm3    Hemoglobin 16.7 13.0 - 17.7 g/dL    Hematocrit 47.9 37.5 - 51.0 %    MCV 88.9 79.0 - 97.0 fL    MCH 31.0 26.6 - 33.0 pg    MCHC 34.9 31.5 - 35.7 g/dL    RDW 12.6 12.3 - 15.4 %    RDW-SD 40.5 37.0 - 54.0 fl    MPV 11.8 6.0 - 12.0 fL    Platelets 235 140 - 450 10*3/mm3    Neutrophil % 55.9 42.7 - 76.0 %    Lymphocyte % 36.8 19.6 - 45.3 %    Monocyte % 5.0 5.0 - 12.0 %    Eosinophil % 1.3 0.3 - 6.2 %    Basophil % 0.7 0.0 - 1.5 %    Immature Grans % 0.3 0.0 - 0.5 %    Neutrophils, Absolute 4.86 1.70 - 7.00 10*3/mm3    Lymphocytes, Absolute 3.19 (H) 0.70 - 3.10 10*3/mm3    Monocytes, Absolute 0.43 0.10 - 0.90 10*3/mm3    Eosinophils, Absolute 0.11 0.00 - 0.40 10*3/mm3    Basophils, Absolute 0.06 0.00 - 0.20 10*3/mm3    Immature Grans, Absolute 0.03 0.00 - 0.05 10*3/mm3    nRBC 0.0 0.0 - 0.2 /100 WBC        Problems Addressed this Visit          Genitourinary and Reproductive     Erectile dysfunction    Relevant Medications    tadalafil (Cialis) 5 MG tablet       Musculoskeletal and Injuries    Chronic right-sided low back pain with right-sided sciatica - Primary     Worsening of chronic low back pain.  Patient had worsening of symptoms about a month ago with no injury or change in activity level.  He has had some relief of symptoms although it has been short-lived with ibuprofen and ice and heat.  Patient will continue the supportive measures at home.  Will check kidney function today as patient has been using ibuprofen chronically.  Patient can take 600 mg of ibuprofen in combination with 500 mg of Tylenol for pain relief.  He was given a prescription for methocarbamol for muscle spasms.  We discussed imaging and physical therapy but patient would like to hold off at this time.   Patient will follow-up in 8 weeks.  RTC/ED precautions given.         Relevant Medications    methocarbamol (ROBAXIN) 750 MG tablet     Other Visit Diagnoses       NSAID long-term use        Relevant Orders    Basic metabolic panel          Diagnoses         Codes Comments    Chronic right-sided low back pain with right-sided sciatica    -  Primary ICD-10-CM: M54.41, G89.29  ICD-9-CM: 724.2, 724.3, 338.29     Erectile dysfunction, unspecified erectile dysfunction type     ICD-10-CM: N52.9  ICD-9-CM: 607.84     NSAID long-term use     ICD-10-CM: Z79.1  ICD-9-CM: V58.64             Return in about 8 weeks (around 11/26/2024) for Annual.    Jovi Kelley MD   10/1/2024

## 2024-10-01 NOTE — PATIENT INSTRUCTIONS
"BMI for Adults  Body mass index (BMI) is a number found using a person's weight and height. BMI can help tell how much of a person's weight is made up of fat. BMI does not measure body fat directly. It is used instead of tests that directly measure body fat, which can be difficult and expensive.  What are BMI measurements used for?  BMI is useful to:  Find out if your weight puts you at higher risk for medical problems.  Help recommend changes, such as in diet and exercise. This can help you reach a healthy weight. BMI screening can be done again to see if these changes are working.  How is BMI calculated?  Your height and weight are measured. The BMI is found from those numbers. This can be done with U[place] or metric measurements. Note that charts and online BMI calculators are available to help you find your BMI quickly and easily without doing these calculations.  To calculate your BMI in [place] measurements:  Measure your weight in pounds (lb).  Multiply the number of pounds by 703.  So, for an adult who weighs 150 lb, multiply that number by 703: 150 x 703, which equals 105,450.  Measure your height in inches. Then multiply that number by itself to get a measurement called \"inches squared.\"  So, for an adult who is 70 inches tall, the \"inches squared\" measurement is 70 inches x 70 inches, which equals 4,900 inches squared.  Divide the total from step 2 (number of lb x 703) by the total from step 3 (inches squared): 105,450 ÷ 4,900 = 21.5. This is your BMI.  To calculate your BMI in metric measurements:    Measure your weight in kilograms (kg).  For this example, the weight is 70 kg.  Measure your height in meters (m). Then multiply that number by itself to get a measurement called \"meters squared.\"  So, for an adult who is 1.75 m tall, the \"meters squared\" measurement is 1.75 m x 1.75 m, which equals 3.1 meters squared.  Divide the number of kilograms (your weight) by the meters squared number. In this example: 70 " ÷ 3.1 = 22.6. This is your BMI.  What do the results mean?  BMI charts are used to see if you are underweight, normal weight, overweight, or obese. The following guidelines will be used:  Underweight: BMI less than 18.5.  Normal weight: BMI between 18.5 and 24.9.  Overweight: BMI between 25 and 29.9.  Obese: BMI of 30 or above.  BMI is a tool and cannot diagnose a condition. Talk with your health care provider about what your BMI means for you. Keep these notes in mind:  Weight includes fat and muscle. Someone with a muscular build, such as an athlete, may have a BMI that is higher than 24.9. In cases like these, BMI is not a correct measure of body fat.  If you have a BMI of 25 or higher, your provider may need to do more testing to find out if excess body fat is the cause.  BMI is measured the same way for males and females. Females usually have more body fat than males of the same height and weight.  Where to find more information  For more information about BMI, including tools to quickly find your BMI, go to:  Centers for Disease Control and Prevention: cdc.gov  American Heart Association: heart.org  National Heart, Lung, and Blood [place]: nhlbi.nih.gov  This information is not intended to replace advice given to you by your health care provider. Make sure you discuss any questions you have with your health care provider.  Document Revised: 09/07/2023 Document Reviewed: 08/31/2023  [employer] Patient Education © 2024 [employer]  Chronic Back Pain  Chronic back pain is back pain that lasts longer than 3 months. The cause of your back pain may not be known. Some common causes include:  Wear and tear (degenerative disease) of the bones, disks, or tissues that connect bones to each other (ligaments) in your back.  Inflammation and stiffness in your back (arthritis).  If you have chronic back pain, you may have times when the pain is more intense (flare-ups). You can also learn to manage the pain with home  care.  Follow these instructions at home:  Watch for any changes in your symptoms. Take these actions to help with your pain:  Managing pain and stiffness         If told, put ice on the painful area. You may be told to apply ice for the first 24-48 hours after a flare-up starts.  Put ice in a plastic bag.  Place a towel between your skin and the bag.  Leave the ice on for 20 minutes, 2-3 times per day.  If told, apply heat to the affected area as often as told by your health care provider. Use the heat source that your provider recommends, such as a moist heat pack or a heating pad.  Place a towel between your skin and the heat source.  Leave the heat on for 20-30 minutes.  If your skin turns bright red, remove the ice or heat right away to prevent skin damage. The risk of damage is higher if you cannot feel pain, heat, or cold.  Try soaking in a warm tub.  Activity              Avoid bending and other activities that make the pain worse.  Have good posture when you stand or sit.  When you stand, keep your upper back and neck straight, with your shoulders pulled back. Avoid slouching.  When you sit, keep your back straight. Relax your shoulders. Do not round your shoulders or pull them backward.  Do not sit or  one place for too long.  Take brief periods of rest during the day. This will reduce your pain. Resting in a lying or standing position is often better than sitting to rest.  When you rest for longer periods, mix in some mild activity or stretching between periods of rest. This will help to prevent stiffness and pain.  Get regular exercise. Ask your provider what activities are safe for you.  You may have to avoid lifting. Ask your provider how much you can safely lift. If you do lift, always use the right technique. This means you should:  Bend your knees.  Keep the load close to your body.  Avoid twisting.  Medicines  Take over-the-counter and prescription medicines only as told by your  provider.  You may need to take medicines for pain and inflammation. These may be taken by mouth or put on the skin. You may also be given muscle relaxants.  Ask your provider if the medicine prescribed to you:  Requires you to avoid driving or using machinery.  Can cause constipation. You may need to take these actions to prevent or treat constipation:  Drink enough fluid to keep your pee (urine) pale yellow.  Take over-the-counter or prescription medicines.  Eat foods that are high in fiber, such as beans, whole grains, and fresh fruits and vegetables.  Limit foods that are high in fat and processed sugars, such as fried or sweet foods.  General instructions    Sleep on a firm mattress in a comfortable position. Try lying on your side with your knees slightly bent. If you lie on your back, put a pillow under your knees.  Do not use any products that contain nicotine or tobacco. These products include cigarettes, chewing tobacco, and vaping devices, such as e-cigarettes. If you need help quitting, ask your provider.  Contact a health care provider if:  You have pain that does not get better with rest or medicine.  You have new pain.  You have a fever.  You lose weight quickly.  You have trouble doing your normal activities.  You feel weak or numb in one or both of your legs or feet.  Get help right away if:  You are not able to control when you pee or poop.  You have severe back pain and:  Nausea or vomiting.  Pain in your chest or abdomen.  Shortness of breath.  You faint.  These symptoms may be an emergency. Get help right away. Call 911.  Do not wait to see if the symptoms will go away.  Do not drive yourself to the hospital.  This information is not intended to replace advice given to you by your health care provider. Make sure you discuss any questions you have with your health care provider.  Document Revised: 08/07/2023 Document Reviewed: 08/07/2023  Elsevier Patient Education © 2024 [employer].

## 2024-10-02 ENCOUNTER — TELEPHONE (OUTPATIENT)
Dept: FAMILY MEDICINE CLINIC | Facility: CLINIC | Age: 64
End: 2024-10-02
Payer: COMMERCIAL

## 2024-10-02 NOTE — TELEPHONE ENCOUNTER
Hub to Relay      Please let the patient know that labs that were drawn at previous visit were stable. Patient should continue current treatment plan.  If patient has any questions they can contact me.    Called patient and left a message to have him to return our call.

## 2024-10-02 NOTE — TELEPHONE ENCOUNTER
"Name: Alf Fields \"Manny\"      Relationship: Self      Best Callback Number: [phone]       HUB PROVIDED THE RELAY MESSAGE FROM THE OFFICE      PATIENT: VOICED UNDERSTANDING AND HAS NO FURTHER QUESTIONS AT THIS TIME    ADDITIONAL INFORMATION:   "

## 2024-10-09 DIAGNOSIS — G89.29 CHRONIC RIGHT-SIDED LOW BACK PAIN WITH RIGHT-SIDED SCIATICA: Primary | ICD-10-CM

## 2024-10-09 DIAGNOSIS — M54.41 CHRONIC RIGHT-SIDED LOW BACK PAIN WITH RIGHT-SIDED SCIATICA: Primary | ICD-10-CM

## 2024-10-09 DIAGNOSIS — M62.830 MUSCLE SPASM OF BACK: ICD-10-CM

## 2024-10-09 RX ORDER — CYCLOBENZAPRINE HCL 5 MG
TABLET ORAL
Qty: 90 TABLET | Refills: 3 | Status: SHIPPED | OUTPATIENT
Start: 2024-10-09

## 2024-11-07 DIAGNOSIS — M54.41 CHRONIC RIGHT-SIDED LOW BACK PAIN WITH RIGHT-SIDED SCIATICA: Primary | ICD-10-CM

## 2024-11-07 DIAGNOSIS — G89.29 CHRONIC RIGHT-SIDED LOW BACK PAIN WITH RIGHT-SIDED SCIATICA: Primary | ICD-10-CM

## 2024-11-08 DIAGNOSIS — M54.41 CHRONIC RIGHT-SIDED LOW BACK PAIN WITH RIGHT-SIDED SCIATICA: Primary | ICD-10-CM

## 2024-11-08 DIAGNOSIS — G89.29 CHRONIC RIGHT-SIDED LOW BACK PAIN WITH RIGHT-SIDED SCIATICA: Primary | ICD-10-CM

## 2024-11-18 ENCOUNTER — OFFICE VISIT (OUTPATIENT)
Dept: FAMILY MEDICINE CLINIC | Facility: CLINIC | Age: 64
End: 2024-11-18
Payer: COMMERCIAL

## 2024-11-18 VITALS
DIASTOLIC BLOOD PRESSURE: 90 MMHG | SYSTOLIC BLOOD PRESSURE: 130 MMHG | WEIGHT: 213 LBS | BODY MASS INDEX: 31.55 KG/M2 | TEMPERATURE: 98.7 F | HEIGHT: 69 IN | HEART RATE: 61 BPM | OXYGEN SATURATION: 98 %

## 2024-11-18 DIAGNOSIS — G89.29 CHRONIC RIGHT-SIDED LOW BACK PAIN WITH RIGHT-SIDED SCIATICA: Primary | ICD-10-CM

## 2024-11-18 DIAGNOSIS — M54.41 CHRONIC RIGHT-SIDED LOW BACK PAIN WITH RIGHT-SIDED SCIATICA: Primary | ICD-10-CM

## 2024-11-18 PROCEDURE — 99214 OFFICE O/P EST MOD 30 MIN: CPT | Performed by: FAMILY MEDICINE

## 2024-11-18 RX ORDER — ESCITALOPRAM OXALATE 20 MG/1
30 TABLET ORAL DAILY
COMMUNITY
End: 2024-11-18

## 2024-11-18 RX ORDER — GABAPENTIN 100 MG/1
100 CAPSULE ORAL 3 TIMES DAILY
Qty: 90 CAPSULE | Refills: 0 | Status: SHIPPED | OUTPATIENT
Start: 2024-11-18

## 2024-11-18 NOTE — ASSESSMENT & PLAN NOTE
Patient feels that overall the low back pain is manageable with the worst symptom he is having is the sciatica pain shooting down his right leg.  MRI has been ordered.  Patient was given referral to pain management for further evaluation and treatment.  Patient will start gabapentin 100 mg 3 times daily and keep his follow-up appointment with me on November 26.  Luigi was reviewed and appropriate.  Controlled substance agreement was signed today.  Will obtain UDS at his follow-up visit.  RTC/ED precautions given.

## 2024-11-18 NOTE — PROGRESS NOTES
Alf Fields is a 64 y.o. male who presents today for Sciatica Pain      Patient has chronic low back pain with right-sided sciatica.  He has been taking Tylenol and ibuprofen multiple times a day and states this takes the edge off but does not completely resolved the pain. At last office visit patient was given an order for methocarbamol to use as needed.  He did not feel like this was helpful so he was switched to cyclobenzaprine.  He also did not have relief with cyclobenzaprine so he was switched to tizanidine. He does not feel the tizanidine has helped much. He has been doing stretches at home. He states that pain shooting down into his leg is the most bothersome.            Review of Systems   Constitutional:  Negative for fever and unexpected weight loss.   HENT:  Negative for congestion, ear pain and sore throat.    Eyes:  Negative for visual disturbance.   Respiratory:  Negative for cough, shortness of breath and wheezing.    Cardiovascular:  Negative for chest pain and palpitations.   Gastrointestinal:  Negative for abdominal pain, blood in stool, constipation, diarrhea, nausea, vomiting and GERD.   Endocrine: Negative for polydipsia and polyuria.   Genitourinary:  Negative for difficulty urinating and urinary incontinence.   Musculoskeletal:  Positive for back pain. Negative for joint swelling.   Skin:  Negative for rash and skin lesions.   Allergic/Immunologic: Negative for environmental allergies.   Neurological:  Positive for numbness (tingling in right foot). Negative for seizures and syncope.   Hematological:  Does not bruise/bleed easily.   Psychiatric/Behavioral:  Negative for suicidal ideas.         The following portions of the patient's history were reviewed and updated as appropriate: allergies, current medications, past family history, past medical history, past social history, past surgical history and problem list.    Current Outpatient Medications on File Prior to Visit   Medication Sig  "Dispense Refill    tadalafil (Cialis) 5 MG tablet Take 1 tablet by mouth Daily As Needed for Erectile Dysfunction. 30 tablet 6    tiZANidine (ZANAFLEX) 4 MG tablet Take 1 tablet by mouth Every 6 (Six) Hours As Needed for Muscle Spasms. 120 tablet 1    [DISCONTINUED] escitalopram (LEXAPRO) 20 MG tablet Take 1.5 tablets by mouth Daily.       No current facility-administered medications on file prior to visit.       No Known Allergies     Visit Vitals  /90 (BP Location: Left arm, Patient Position: Sitting, Cuff Size: Adult)   Pulse 61   Temp 98.7 °F (37.1 °C) (Infrared)   Ht 175.3 cm (69\")   Wt 96.6 kg (213 lb)   SpO2 98%   BMI 31.45 kg/m²        Physical Exam  Constitutional:       General: He is not in acute distress.     Appearance: He is well-developed. He is not diaphoretic.   HENT:      Head: Atraumatic.   Cardiovascular:      Rate and Rhythm: Normal rate and regular rhythm.      Heart sounds: Normal heart sounds. No murmur heard.     No friction rub. No gallop.   Pulmonary:      Effort: Pulmonary effort is normal. No respiratory distress.      Breath sounds: Normal breath sounds. No stridor. No wheezing, rhonchi or rales.   Musculoskeletal:      Cervical back: Normal range of motion and neck supple.      Lumbar back: Spasms (right sided paraspinal muscles) and tenderness (right sided paraspinal muscles) present. No swelling, edema, deformity, signs of trauma, lacerations or bony tenderness. Normal range of motion. Negative right straight leg raise test and negative left straight leg raise test. No scoliosis.   Skin:     General: Skin is warm and dry.   Neurological:      Mental Status: He is alert and oriented to person, place, and time.   Psychiatric:         Behavior: Behavior normal.          Results for orders placed or performed in visit on 10/01/24   Basic metabolic panel    Collection Time: 10/01/24 11:31 AM    Specimen: Blood   Result Value Ref Range    Glucose 86 65 - 99 mg/dL    BUN 16 8 - 23 " mg/dL    Creatinine 1.01 0.76 - 1.27 mg/dL    Sodium 136 136 - 145 mmol/L    Potassium 4.3 3.5 - 5.2 mmol/L    Chloride 103 98 - 107 mmol/L    CO2 23.0 22.0 - 29.0 mmol/L    Calcium 9.4 8.6 - 10.5 mg/dL    BUN/Creatinine Ratio 15.8 7.0 - 25.0    Anion Gap 10.0 5.0 - 15.0 mmol/L    eGFR 84.1 >60.0 mL/min/1.73        Problems Addressed this Visit          Musculoskeletal and Injuries    Chronic right-sided low back pain with right-sided sciatica - Primary     Patient feels that overall the low back pain is manageable with the worst symptom he is having is the sciatica pain shooting down his right leg.  MRI has been ordered.  Patient was given referral to pain management for further evaluation and treatment.  Patient will start gabapentin 100 mg 3 times daily and keep his follow-up appointment with me on November 26.  Luigi was reviewed and appropriate.  Controlled substance agreement was signed today.  Will obtain UDS at his follow-up visit.  RTC/ED precautions given.         Relevant Medications    gabapentin (NEURONTIN) 100 MG capsule    Other Relevant Orders    Ambulatory Referral to Pain Management Clinic     Diagnoses         Codes Comments    Chronic right-sided low back pain with right-sided sciatica    -  Primary ICD-10-CM: M54.41, G89.29  ICD-9-CM: 724.2, 724.3, 338.29             Return for Next scheduled follow up.    Jovi Kelley MD   11/18/2024

## 2024-11-26 ENCOUNTER — LAB (OUTPATIENT)
Dept: LAB | Facility: HOSPITAL | Age: 64
End: 2024-11-26
Payer: COMMERCIAL

## 2024-11-26 ENCOUNTER — OFFICE VISIT (OUTPATIENT)
Dept: FAMILY MEDICINE CLINIC | Facility: CLINIC | Age: 64
End: 2024-11-26
Payer: COMMERCIAL

## 2024-11-26 VITALS
OXYGEN SATURATION: 95 % | HEART RATE: 96 BPM | DIASTOLIC BLOOD PRESSURE: 82 MMHG | SYSTOLIC BLOOD PRESSURE: 134 MMHG | BODY MASS INDEX: 31.7 KG/M2 | HEIGHT: 69 IN | WEIGHT: 214 LBS | TEMPERATURE: 99.1 F

## 2024-11-26 DIAGNOSIS — Z00.00 WELL ADULT EXAM: Primary | ICD-10-CM

## 2024-11-26 DIAGNOSIS — E66.09 CLASS 1 OBESITY DUE TO EXCESS CALORIES WITHOUT SERIOUS COMORBIDITY WITH BODY MASS INDEX (BMI) OF 31.0 TO 31.9 IN ADULT: ICD-10-CM

## 2024-11-26 DIAGNOSIS — M54.41 CHRONIC RIGHT-SIDED LOW BACK PAIN WITH RIGHT-SIDED SCIATICA: ICD-10-CM

## 2024-11-26 DIAGNOSIS — Z23 IMMUNIZATION DUE: ICD-10-CM

## 2024-11-26 DIAGNOSIS — Z12.5 SCREENING FOR PROSTATE CANCER: ICD-10-CM

## 2024-11-26 DIAGNOSIS — E66.811 CLASS 1 OBESITY DUE TO EXCESS CALORIES WITHOUT SERIOUS COMORBIDITY WITH BODY MASS INDEX (BMI) OF 31.0 TO 31.9 IN ADULT: ICD-10-CM

## 2024-11-26 DIAGNOSIS — F17.210 SMOKING GREATER THAN 30 PACK YEARS: ICD-10-CM

## 2024-11-26 DIAGNOSIS — Z51.81 THERAPEUTIC DRUG MONITORING: ICD-10-CM

## 2024-11-26 DIAGNOSIS — G89.29 CHRONIC RIGHT-SIDED LOW BACK PAIN WITH RIGHT-SIDED SCIATICA: ICD-10-CM

## 2024-11-26 LAB
BASOPHILS # BLD AUTO: 0.09 10*3/MM3 (ref 0–0.2)
BASOPHILS NFR BLD AUTO: 0.9 % (ref 0–1.5)
DEPRECATED RDW RBC AUTO: 39.6 FL (ref 37–54)
EOSINOPHIL # BLD AUTO: 0.24 10*3/MM3 (ref 0–0.4)
EOSINOPHIL NFR BLD AUTO: 2.4 % (ref 0.3–6.2)
ERYTHROCYTE [DISTWIDTH] IN BLOOD BY AUTOMATED COUNT: 12.3 % (ref 12.3–15.4)
HCT VFR BLD AUTO: 46.3 % (ref 37.5–51)
HGB BLD-MCNC: 16 G/DL (ref 13–17.7)
IMM GRANULOCYTES # BLD AUTO: 0.03 10*3/MM3 (ref 0–0.05)
IMM GRANULOCYTES NFR BLD AUTO: 0.3 % (ref 0–0.5)
LYMPHOCYTES # BLD AUTO: 3.56 10*3/MM3 (ref 0.7–3.1)
LYMPHOCYTES NFR BLD AUTO: 36 % (ref 19.6–45.3)
MCH RBC QN AUTO: 30.8 PG (ref 26.6–33)
MCHC RBC AUTO-ENTMCNC: 34.6 G/DL (ref 31.5–35.7)
MCV RBC AUTO: 89 FL (ref 79–97)
MONOCYTES # BLD AUTO: 0.59 10*3/MM3 (ref 0.1–0.9)
MONOCYTES NFR BLD AUTO: 6 % (ref 5–12)
NEUTROPHILS NFR BLD AUTO: 5.37 10*3/MM3 (ref 1.7–7)
NEUTROPHILS NFR BLD AUTO: 54.4 % (ref 42.7–76)
NRBC BLD AUTO-RTO: 0 /100 WBC (ref 0–0.2)
PLATELET # BLD AUTO: 238 10*3/MM3 (ref 140–450)
PMV BLD AUTO: 12.1 FL (ref 6–12)
RBC # BLD AUTO: 5.2 10*6/MM3 (ref 4.14–5.8)
WBC NRBC COR # BLD AUTO: 9.88 10*3/MM3 (ref 3.4–10.8)

## 2024-11-26 PROCEDURE — 80061 LIPID PANEL: CPT | Performed by: FAMILY MEDICINE

## 2024-11-26 PROCEDURE — 90677 PCV20 VACCINE IM: CPT | Performed by: FAMILY MEDICINE

## 2024-11-26 PROCEDURE — 90656 IIV3 VACC NO PRSV 0.5 ML IM: CPT | Performed by: FAMILY MEDICINE

## 2024-11-26 PROCEDURE — 85025 COMPLETE CBC W/AUTO DIFF WBC: CPT | Performed by: FAMILY MEDICINE

## 2024-11-26 PROCEDURE — 90471 IMMUNIZATION ADMIN: CPT | Performed by: FAMILY MEDICINE

## 2024-11-26 PROCEDURE — 83036 HEMOGLOBIN GLYCOSYLATED A1C: CPT | Performed by: FAMILY MEDICINE

## 2024-11-26 PROCEDURE — 90472 IMMUNIZATION ADMIN EACH ADD: CPT | Performed by: FAMILY MEDICINE

## 2024-11-26 PROCEDURE — G0103 PSA SCREENING: HCPCS | Performed by: FAMILY MEDICINE

## 2024-11-26 PROCEDURE — 99396 PREV VISIT EST AGE 40-64: CPT | Performed by: FAMILY MEDICINE

## 2024-11-26 PROCEDURE — 84443 ASSAY THYROID STIM HORMONE: CPT | Performed by: FAMILY MEDICINE

## 2024-11-26 PROCEDURE — 80053 COMPREHEN METABOLIC PANEL: CPT | Performed by: FAMILY MEDICINE

## 2024-11-26 RX ORDER — GABAPENTIN 100 MG/1
200 CAPSULE ORAL 3 TIMES DAILY
Status: SHIPPED
Start: 2024-11-26

## 2024-11-26 NOTE — PATIENT INSTRUCTIONS

## 2024-11-26 NOTE — ASSESSMENT & PLAN NOTE
Patient's (Body mass index is 31.6 kg/m².) indicates that they are obese (BMI >30) with health conditions that include none . Weight is worsening. BMI  is above average; BMI management plan is completed. We discussed low calorie, low carb based diet program, portion control, and increasing exercise.

## 2024-11-26 NOTE — PROGRESS NOTES
Alf Fields is a 64 y.o. male who presents today to complete annual exam.    Chief Complaint   Patient presents with    Annual Exam        Patient is here for annual exam. Walking the dog but not as much recently due to back pain. He eats a generally healthy and varied diet. He sees the dentist periodically. He sees the optometrist once a year. He has not seen a dermatologist. He has no acute complaints today. He has MRI scheduled for early next month and has pain clinic referral pending.            Review of Systems   Constitutional:  Negative for fever and unexpected weight loss.   HENT:  Negative for congestion, ear pain and sore throat.    Eyes:  Negative for visual disturbance.   Respiratory:  Negative for cough, shortness of breath and wheezing.    Cardiovascular:  Negative for chest pain and palpitations.   Gastrointestinal:  Negative for abdominal pain, blood in stool, constipation, diarrhea, nausea, vomiting and GERD.   Endocrine: Negative for polydipsia and polyuria.   Genitourinary:  Negative for difficulty urinating and urinary incontinence.   Musculoskeletal:  Positive for back pain. Negative for joint swelling.   Skin:  Negative for rash and skin lesions.   Allergic/Immunologic: Negative for environmental allergies.   Neurological:  Positive for numbness (tingling in right foot). Negative for seizures and syncope.   Hematological:  Does not bruise/bleed easily.   Psychiatric/Behavioral:  Negative for suicidal ideas.             10/1/2024    10:53 AM   PHQ-2/PHQ-9 Depression Screening   Retired Little Interest or Pleasure in Doing Things 0-->not at all   Retired Feeling Down, Depressed or Hopeless 0-->not at all   Retired PHQ-9: Brief Depression Severity Measure Score 0           Past Medical History:   Diagnosis Date    Anxiety     Comes and goes with stress levels    Degenerative joint disease (DJD) of lumbar spine     Erectile dysfunction 2018    Can't afford the meds    Low back pain         Past  "Surgical History:   Procedure Laterality Date    ANKLE ARTHROSCOPY Left 2010    CHOLECYSTECTOMY      COLONOSCOPY  2018        Family History   Problem Relation Age of Onset    Drug abuse Mother         Heroin    Alcohol abuse Sister     Cataracts Maternal Grandmother     Arthritis Maternal Grandmother     No Known Problems Daughter     Anxiety disorder Daughter     No Known Problems Son     Anxiety disorder Son     Early death Maternal Aunt         Hodgkins was 22 years old        Social History     Socioeconomic History    Marital status:      Spouse name: Arely    Number of children: 2    Years of education: H.S.   Tobacco Use    Smoking status: Some Days     Current packs/day: 1.50     Average packs/day: 1.5 packs/day for 20.2 years (30.2 ttl pk-yrs)     Types: Cigarettes     Start date: 10/1/2004    Smokeless tobacco: Never    Tobacco comments:     2-3 cigarettes a day   Vaping Use    Vaping status: Never Used   Substance and Sexual Activity    Alcohol use: No     Comment: Rare    Drug use: No    Sexual activity: Yes     Partners: Female     Birth control/protection: Post-menopausal        Current Outpatient Medications on File Prior to Visit   Medication Sig Dispense Refill    tadalafil (Cialis) 5 MG tablet Take 1 tablet by mouth Daily As Needed for Erectile Dysfunction. 30 tablet 6    tiZANidine (ZANAFLEX) 4 MG tablet Take 1 tablet by mouth Every 6 (Six) Hours As Needed for Muscle Spasms. 120 tablet 1    [DISCONTINUED] gabapentin (NEURONTIN) 100 MG capsule Take 1 capsule by mouth 3 (Three) Times a Day. 90 capsule 0     No current facility-administered medications on file prior to visit.       No Known Allergies     Visit Vitals  /82 (BP Location: Left arm, Patient Position: Sitting, Cuff Size: Adult)   Pulse 96   Temp 99.1 °F (37.3 °C) (Infrared)   Ht 175.3 cm (69\")   Wt 97.1 kg (214 lb)   SpO2 95%   BMI 31.60 kg/m²      Body mass index is 31.6 kg/m².    Physical Exam  Constitutional:       General: " He is not in acute distress.     Appearance: He is well-developed. He is not diaphoretic.   HENT:      Head: Atraumatic.   Cardiovascular:      Rate and Rhythm: Normal rate and regular rhythm.      Heart sounds: Normal heart sounds. No murmur heard.     No friction rub. No gallop.   Pulmonary:      Effort: Pulmonary effort is normal. No respiratory distress.      Breath sounds: Normal breath sounds. No stridor. No wheezing, rhonchi or rales.   Abdominal:      General: Bowel sounds are normal. There is no distension.      Palpations: Abdomen is soft. There is no mass.      Tenderness: There is no abdominal tenderness. There is no guarding or rebound.      Hernia: No hernia is present.   Musculoskeletal:      Cervical back: Normal range of motion and neck supple.   Skin:     General: Skin is warm and dry.   Neurological:      Mental Status: He is alert and oriented to person, place, and time.   Psychiatric:         Behavior: Behavior normal.          Results for orders placed or performed in visit on 10/01/24   Basic metabolic panel    Collection Time: 10/01/24 11:31 AM    Specimen: Blood   Result Value Ref Range    Glucose 86 65 - 99 mg/dL    BUN 16 8 - 23 mg/dL    Creatinine 1.01 0.76 - 1.27 mg/dL    Sodium 136 136 - 145 mmol/L    Potassium 4.3 3.5 - 5.2 mmol/L    Chloride 103 98 - 107 mmol/L    CO2 23.0 22.0 - 29.0 mmol/L    Calcium 9.4 8.6 - 10.5 mg/dL    BUN/Creatinine Ratio 15.8 7.0 - 25.0    Anion Gap 10.0 5.0 - 15.0 mmol/L    eGFR 84.1 >60.0 mL/min/1.73        Problems Addressed this Visit          Endocrine and Metabolic    Class 1 obesity due to excess calories without serious comorbidity with body mass index (BMI) of 31.0 to 31.9 in adult     Patient's (Body mass index is 31.6 kg/m².) indicates that they are obese (BMI >30) with health conditions that include none . Weight is worsening. BMI  is above average; BMI management plan is completed. We discussed low calorie, low carb based diet program, portion  control, and increasing exercise.          Relevant Orders    Comprehensive Metabolic Panel    TSH Rfx On Abnormal To Free T4    CBC & Differential    Lipid Panel    Hemoglobin A1c       Genitourinary and Reproductive     Screening for prostate cancer    Relevant Orders    PSA Screen       Health Encounters    Well adult exam - Primary     The patient is here for health maintenance visit.  Currently, the patient consumes a healthy diet and has an adequate exercise regimen.  Screening lab work is ordered.  Immunizations were reviewed today.  Advice and education was given regarding nutrition, aerobic exercise, routine dental evaluations, routine eye exams, reproductive health, cardiovascular risk reduction, sunscreen use, self skin examination (annual dermatology evaluations) and seatbelt use (general overall safety).  Further recommendations will be given if needed after lab evaluation.  Annual wellness evaluation is recommended.           Relevant Orders    Comprehensive Metabolic Panel    TSH Rfx On Abnormal To Free T4    CBC & Differential    Lipid Panel    PSA Screen    Hemoglobin A1c    Compliance Drug Analysis, Ur - Urine, Clean Catch       Musculoskeletal and Injuries    Chronic right-sided low back pain with right-sided sciatica    Relevant Medications    gabapentin (NEURONTIN) 100 MG capsule       Tobacco    Smoking greater than 30 pack years    Relevant Orders     CT Chest Low Dose Cancer Screening WO     Other Visit Diagnoses       Therapeutic drug monitoring        Relevant Orders    Compliance Drug Analysis, Ur - Urine, Clean Catch    Immunization due        Relevant Orders    Pneumococcal Conjugate Vaccine 20-Valent (PCV20)    Fluzone >6mos (7526-2047)          Diagnoses         Codes Comments    Well adult exam    -  Primary ICD-10-CM: Z00.00  ICD-9-CM: V70.0     Screening for prostate cancer     ICD-10-CM: Z12.5  ICD-9-CM: V76.44     Class 1 obesity due to excess calories without serious comorbidity  with body mass index (BMI) of 31.0 to 31.9 in adult     ICD-10-CM: E66.811, E66.09, Z68.31  ICD-9-CM: 278.00, V85.31     Therapeutic drug monitoring     ICD-10-CM: Z51.81  ICD-9-CM: V58.83     Chronic right-sided low back pain with right-sided sciatica     ICD-10-CM: M54.41, G89.29  ICD-9-CM: 724.2, 724.3, 338.29     Smoking greater than 30 pack years     ICD-10-CM: F17.210  ICD-9-CM: 305.1     Immunization due     ICD-10-CM: Z23  ICD-9-CM: V05.9             Return in about 3 months (around 2/26/2025) for Follow-up back pain.    Jovi Kelley MD  11/26/2024

## 2024-11-26 NOTE — LETTER
[facility]  Vaccine Consent Form    Patient Name:  Alf Fields  Patient :  1960     Vaccine(s) Ordered    Pneumococcal Conjugate Vaccine 20-Valent (PCV20)  Fluzone >6mos ([id number])        Screening Checklist  The following questions should be completed prior to vaccination. If you answer “yes” to any question, it does not necessarily mean you should not be vaccinated. It just means we may need to clarify or ask more questions. If a question is unclear, please ask your healthcare provider to explain it.    Yes No   Any fever or moderate to severe illness today (mild illness and/or antibiotic treatment are not contraindications)?     Do you have a history of a serious reaction to any previous vaccinations, such as anaphylaxis, encephalopathy within 7 days, Guillain-Hubbard syndrome within 6 weeks, seizure?     Have you received any live vaccine(s) (e.g MMR, ROSALIO) or any other vaccines in the last month (to ensure duplicate doses aren't given)?     Do you have an anaphylactic allergy to latex (DTaP, DTaP-IPV, Hep A, Hep B, MenB, RV, Td, Tdap), baker’s yeast (Hep B, HPV), polysorbates (RSV, nirsevimab, PCV 20, Rotavirrus, Tdap, Shingrix), or gelatin (ROSALIO, MMR)?     Do you have an anaphylactic allergy to neomycin (Rabies, ROSALIO, MMR, IPV, Hep A), polymyxin B (IPV), or streptomycin (IPV)?      Any cancer, leukemia, AIDS, or other immune system disorder? (ROSALIO, MMR, RV)     Do you have a parent, brother, or sister with an immune system problem (if immune competence of vaccine recipient clinically verified, can proceed)? (MMR, ROSALIO)     Any recent steroid treatments for >2 weeks, chemotherapy, or radiation treatment? (ROSALIO, MMR)     Have you received antibody-containing blood transfusions or IVIG in the past 11 months (recommended interval is dependent on product)? (MMR, ROSALIO)     Have you taken antiviral drugs (acyclovir, famciclovir, valacyclovir for ROSALIO) in the last 24 or 48 hours, respectively?      Are you  "pregnant or planning to become pregnant within 1 month? (ROSALIO, MMR, HPV, IPV, MenB, Abrexvy; For Hep B- refer to Engerix-B; For RSV - Abrysvo is indicated for 32-36 weeks of pregnancy from September to January)     For infants, have you ever been told your child has had intussusception or a medical emergency involving obstruction of the intestine (Rotavirus)? If not for an infant, can skip this question.         *Ordering Physicians/APC should be consulted if \"yes\" is checked by the patient or guardian above.  I have received, read, and understand the Vaccine Information Statement (VIS) for each vaccine ordered.  I have considered my or my child's health status as well as the health status of my close contacts.  I have taken the opportunity to discuss my vaccine questions with my or my child's health care provider.   I have requested that the ordered vaccine(s) be given to me or my child.  I understand the benefits and risks of the vaccines.  I understand that I should remain in the clinic for 15 minutes after receiving the vaccine(s).  _________________________________________________________  Signature of Patient or Parent/Legal Guardian ____________________  Date     "

## 2024-11-27 LAB
ALBUMIN SERPL-MCNC: 4.6 G/DL (ref 3.5–5.2)
ALBUMIN/GLOB SERPL: 1.6 G/DL
ALP SERPL-CCNC: 89 U/L (ref 39–117)
ALT SERPL W P-5'-P-CCNC: 23 U/L (ref 1–41)
ANION GAP SERPL CALCULATED.3IONS-SCNC: 11.1 MMOL/L (ref 5–15)
AST SERPL-CCNC: 23 U/L (ref 1–40)
BILIRUB SERPL-MCNC: 0.3 MG/DL (ref 0–1.2)
BUN SERPL-MCNC: 14 MG/DL (ref 8–23)
BUN/CREAT SERPL: 13.5 (ref 7–25)
CALCIUM SPEC-SCNC: 9.5 MG/DL (ref 8.6–10.5)
CHLORIDE SERPL-SCNC: 103 MMOL/L (ref 98–107)
CHOLEST SERPL-MCNC: 225 MG/DL (ref 0–200)
CO2 SERPL-SCNC: 23.9 MMOL/L (ref 22–29)
CREAT SERPL-MCNC: 1.04 MG/DL (ref 0.76–1.27)
EGFRCR SERPLBLD CKD-EPI 2021: 81.2 ML/MIN/1.73
GLOBULIN UR ELPH-MCNC: 2.8 GM/DL
GLUCOSE SERPL-MCNC: 91 MG/DL (ref 65–99)
HBA1C MFR BLD: 5.6 % (ref 4.8–5.6)
HDLC SERPL-MCNC: 45 MG/DL (ref 40–60)
LDLC SERPL CALC-MCNC: 152 MG/DL (ref 0–100)
LDLC/HDLC SERPL: 3.32 {RATIO}
POTASSIUM SERPL-SCNC: 4.3 MMOL/L (ref 3.5–5.2)
PROT SERPL-MCNC: 7.4 G/DL (ref 6–8.5)
PSA SERPL-MCNC: 0.58 NG/ML (ref 0–4)
SODIUM SERPL-SCNC: 138 MMOL/L (ref 136–145)
TRIGL SERPL-MCNC: 153 MG/DL (ref 0–150)
TSH SERPL DL<=0.05 MIU/L-ACNC: 1.19 UIU/ML (ref 0.27–4.2)
VLDLC SERPL-MCNC: 28 MG/DL (ref 5–40)

## 2024-12-02 ENCOUNTER — TELEPHONE (OUTPATIENT)
Dept: FAMILY MEDICINE CLINIC | Facility: CLINIC | Age: 64
End: 2024-12-02
Payer: COMMERCIAL

## 2024-12-03 NOTE — TELEPHONE ENCOUNTER
This is the message from the financial clearing team. I don't see the denial scanned into media yet.      [employer] needs start and full duration of previous provider directed conservative treatment within the past twelve weeks with any provider related to this MRI Lumbar Spine, (spinal canal and contents); without contrast material request.

## 2024-12-04 NOTE — TELEPHONE ENCOUNTER
Spoke to patient and he is going to call [facility] insurance company and [employer] billing dept to see how much an MRI is going to cost him. He did state that he is willing to do Physical Therapy  for 12 weeks if he needs to.

## 2024-12-07 DIAGNOSIS — M54.41 CHRONIC RIGHT-SIDED LOW BACK PAIN WITH RIGHT-SIDED SCIATICA: Primary | ICD-10-CM

## 2024-12-07 DIAGNOSIS — G89.29 CHRONIC RIGHT-SIDED LOW BACK PAIN WITH RIGHT-SIDED SCIATICA: Primary | ICD-10-CM

## 2024-12-09 ENCOUNTER — TELEPHONE (OUTPATIENT)
Dept: FAMILY MEDICINE CLINIC | Facility: CLINIC | Age: 64
End: 2024-12-09
Payer: COMMERCIAL

## 2024-12-09 NOTE — TELEPHONE ENCOUNTER
Okay for the HUB to relay:     Please let the patient know that labs that were drawn at previous visit were stable except for LDL cholesterol which is increased from 146-152. I would recommend diet and lifestyle modifications which include avoidance of foods that are high in fat, sugar, and carbohydrates.  Focus on trying to eat leafy green vegetables, foods that are high in fiber, and lean meats.  Patient should also try to get at least 150 minutes of cardiovascular exercise weekly.  Patient may also benefit from starting statin medication to lower cholesterol levels and reduce the risk of heart attack and stroke.  The patient is agreeable to this please let me know and I will call in rosuvastatin to his pharmacy.  Patient should otherwise continue current treatment plan.  If patient has any questions they can contact me.

## 2024-12-18 DIAGNOSIS — G89.29 CHRONIC RIGHT-SIDED LOW BACK PAIN WITH RIGHT-SIDED SCIATICA: ICD-10-CM

## 2024-12-18 DIAGNOSIS — M54.41 CHRONIC RIGHT-SIDED LOW BACK PAIN WITH RIGHT-SIDED SCIATICA: ICD-10-CM

## 2024-12-18 RX ORDER — GABAPENTIN 300 MG/1
300 CAPSULE ORAL 2 TIMES DAILY
Qty: 60 CAPSULE | Refills: 2 | Status: SHIPPED | OUTPATIENT
Start: 2024-12-18

## 2024-12-27 ENCOUNTER — TREATMENT (OUTPATIENT)
Dept: PHYSICAL THERAPY | Facility: CLINIC | Age: 64
End: 2024-12-27
Payer: COMMERCIAL

## 2024-12-27 DIAGNOSIS — M54.41 CHRONIC RIGHT-SIDED LOW BACK PAIN WITH RIGHT-SIDED SCIATICA: Primary | ICD-10-CM

## 2024-12-27 DIAGNOSIS — G89.29 CHRONIC RIGHT-SIDED LOW BACK PAIN WITH RIGHT-SIDED SCIATICA: Primary | ICD-10-CM

## 2024-12-27 PROCEDURE — 97110 THERAPEUTIC EXERCISES: CPT | Performed by: PHYSICAL THERAPIST

## 2024-12-27 PROCEDURE — 97161 PT EVAL LOW COMPLEX 20 MIN: CPT | Performed by: PHYSICAL THERAPIST

## 2024-12-27 PROCEDURE — 97140 MANUAL THERAPY 1/> REGIONS: CPT | Performed by: PHYSICAL THERAPIST

## 2024-12-27 NOTE — PROGRESS NOTES
Physical Therapy Initial Evaluation and Plan of Care  [facility] PHYSICAL THERAPY  [address] [address] [phone]      Patient: Alf Fields   : 1960  Diagnosis/ICD-10 Code:  Chronic right-sided low back pain with right-sided sciatica [M54.41, G89.29]  Referring practitioner: Jovi Kelley MD  Date of Initial Visit: 2024  Today's Date: 2024  Patient seen for Visit count could not be calculated. Make sure you are using a visit which is associated with an episode. session         Visit Diagnoses:    ICD-10-CM ICD-9-CM   1. Chronic right-sided low back pain with right-sided sciatica  M54.41 724.2    G89.29 724.3     338.29       Patient Active Problem List    Diagnosis Date Noted    Class 1 obesity due to excess calories without serious comorbidity with body mass index (BMI) of 31.0 to 31.9 in adult 2024    Chronic right-sided low back pain with right-sided sciatica 10/01/2024    Screening for prostate cancer 03/10/2022    Smoking greater than 30 pack years 03/10/2022    Anxiety      Note Last Updated: 2020     Comes and goes with stress levels      Encounter for smoking cessation counseling     Well adult exam 2020    Degenerative joint disease (DJD) of lumbar spine 2020    Postprandial RUQ pain 2020    Peptic ulcer disease 2020    Erectile dysfunction 2018     Note Last Updated: 2020     Can't afford the meds       Past Medical History:   Diagnosis Date    Anxiety     Comes and goes with stress levels    Degenerative joint disease (DJD) of lumbar spine     Erectile dysfunction     Can't afford the meds    Low back pain      Past Surgical History:   Procedure Laterality Date    ANKLE ARTHROSCOPY Left 2010    CHOLECYSTECTOMY      COLONOSCOPY  2018       Subjective Questionnaire: Oswestry: 46%      Subjective Evaluation    History of Present Illness  Mechanism of injury: Chronic LBP  "with sciatica  Pain since August, insidious onset, pain down back of right leg  Started gabapentin, 4-5 weeks ago; sitting gave the back relief    One week ago pain shifted from back to knee; no hx of knee issues   Was resting in recliner, with knees bent watching TV; when he went to get up, his knee was very painful, has hurt since then  Has tried heat/ice to knee, short term relief  Taking a lot of ibuprofen     Knee doesn't hurt at rest, hurts with movement     Has car washes, 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    Sitting > 1 hour causes worse knee pain  Back pain was worse with standing/walking     Quality of life: good    Pain  Current pain ratin  At worst pain ratin  Location: right knee  Quality: discomfort, dull ache and radiating  Relieving factors: change in position, medications and rest  Aggravating factors: prolonged positioning, ambulation, standing, sleeping, movement and lifting  Progression: improved    Patient Goals  Patient goals for therapy: decreased pain, independence with ADLs/IADLs, increased motion, return to sport/leisure activities and return to work             Objective        Special Questions      Additional Special Questions  Denies red flags      Postural Observations  Seated posture: poor  Standing posture: poor    Additional Postural Observation Details  Increased slouch, forward head, rounded shoulders      Palpation     Right   Hypertonic in the lumbar paraspinals and quadratus lumborum. Tenderness of the lumbar paraspinals and quadratus lumborum.     Additional Palpation Details  Left anterior innominate     Tenderness     Lumbar Spine  Tenderness in the spinous process.     Additional Tenderness Details  L4-5   \"Tingly\" to palpate right piriformis         Neurological Testing     Sensation     Lumbar   Left   Intact: light touch    Right   Intact: light touch    Reflexes   Left   Patellar (L4): normal (2+)  Achilles (S1): normal (2+)    Right   Patellar (L4): normal " (2+)  Achilles (S1): absent (0)    Additional Neurological Details  Able to toe/heel walk    Active Range of Motion     Lumbar   Normal active range of motion    Additional Active Range of Motion Details  Lumbar ROM WNL; standing extensions increased right knee pain    Strength/Myotome Testing     Lumbar   Left   Normal strength    Right   Normal strength    Additional Strength Details  C/o right knee weakness/buckling occasionally    Able to SLS bilaterally     Tests     Lumbar     Right   Positive crossed SLR.     Additional Tests Details  Tingling in right leg with SLR     General Comments     Lumbar Comments  Right knee ROM pain free and WNL           Assessment & Plan       Assessment  Impairments: abnormal gait, abnormal muscle tone, abnormal or restricted ROM, activity intolerance, lacks appropriate home exercise program and pain with function   Functional limitations: carrying objects, lifting, sleeping, walking, pulling, pushing, uncomfortable because of pain, sitting, standing and stooping   Assessment details: Very pleasant 64-year-old patient presents to physical therapy with complaints of chronic low back pain that refers into the right leg; the gabapentin has seemed to help the patient presents today with new onset of right knee pain; he states that recently his back is felt good but his right knee has been very painful; insidious onset of knee pain, no history of knee pain; patient has signs and symptoms consistent with lumbar radiculopathy; he does however have normal reflexes and 5/5 lower extremity strength bilaterally; he does have signs of left anterior pelvic innominate today along with paraspinal hypertonicity and piriformis hypertonicity that causes tingling distally with palpation; clinical exam of right knee is within normal limits, but he does seem to have some increased right knee pain with lumbar extension ;he is an excellent candidate for physical therapy and should respond well to  PT  Barriers to therapy: Radicular pain, chronicity, travels extensively for work  Prognosis: fair    Goals  Plan Goals: 4 weeks  1.  Patient to be independent with home exercise program  2.  Patient to display full range of motion of lumbar spine without pain  3.  Patient to report improved ability to stand/walk as well as sit for prolonged periods of time  4.  Patient to display minimal to no tenderness of lumbar paraspinals or piriformis    8 weeks  1.  Patient to display no innominate and pelvis  2.  Patient to improve MATTIE score by 1M LIVE  3.  Patient to participate in up to 60 minutes of physical therapy for lumbar/core stabilization without increase in pain  4.  Patient to report ability to tolerate prolonged positions without increased pain    Plan  Therapy options: will be seen for skilled therapy services  Planned modality interventions: iontophoresis, TENS, thermotherapy (hydrocollator packs), ultrasound, traction, high voltage pulsed current (pain management), dry needling and cryotherapy  Planned therapy interventions: manual therapy, neuromuscular re-education, orthotic fitting/training, soft tissue mobilization, spinal/joint mobilization, strengthening, stretching, therapeutic activities, joint mobilization, home exercise program, gait training, functional ROM exercises, fine motor coordination training, balance/weight-bearing training and body mechanics training  Frequency: 2x week  Duration in weeks: 12  Treatment plan discussed with: patient        Access Code: [email or web address]  URL: [email or web address]  Date: 12/31/2024  Prepared by: Alf Salas    Exercises  - Supine Piriformis Stretch with Foot on Ground (Mirrored)  - 1 x daily - 7 x weekly - 1 sets - 5 reps - 20 hold  - Supine Figure 4 Piriformis Stretch (Mirrored)  - 1 x daily - 7 x weekly - 1 sets - 5 reps - 20 hold  - Bent Knee Fallouts  - 1 x daily - 7 x weekly - 1 sets - 20 reps  - Supine Lower Trunk Rotation  - 1 x daily - 7 x  "weekly - 1 sets - 5 reps - 10-20\" hold  - Cat Cow to Child's Pose  - 1 x daily - 7 x weekly - 1 sets - 10 reps  - Supine 90/90 Sciatic Nerve Glide with Knee Flexion/Extension  - 1 x daily - 7 x weekly - 2 sets - 10 reps  - 90/90 SI Joint Self-Correction with Dowel (Mirrored)  - 1 x daily - 7 x weekly - 1 sets - 10 reps - 10 hold    Timed:         Manual Therapy:    10     mins  55088;     Therapeutic Exercise:    15     mins  44611;     Neuromuscular Malachi:        mins  80354;    Therapeutic Activity:          mins  71233;     Gait Training:           mins  92680;     Ultrasound:          mins  15446;    Ionto                                   mins   13155  Self Care                            mins   98661  Canalith Repos         mins 12971      Un-Timed:  Electrical Stimulation:         mins  36761 ( );  Dry Needling          mins self-pay  Traction          mins 83805    Low Eval     35     Mins  02658  Mod Eval          Mins  52927  High Eval                            Mins  70386        Timed Treatment:   25   mins   Total Treatment:     60   mins          PT: PEDRO Salas PT     License Number: [id number]  Electronically signed by PEDRO Salas, PT, 12/27/24, 2:32 PM EST    Certification Period: 12/31/2024 thru 3/30/2025  I certify that the therapy services are furnished while this patient is under my care.  The services outlined above are required by this patient, and will be reviewed every 90 days.         Physician Signature:__________________________________________________    PHYSICIAN: Jovi Kelley MD  NPI: [id number]                                      DATE:      Please sign and return via fax to [email or web address] . Thank you, [facility].  "

## 2025-01-13 ENCOUNTER — TREATMENT (OUTPATIENT)
Dept: PHYSICAL THERAPY | Facility: CLINIC | Age: 65
End: 2025-01-13
Payer: COMMERCIAL

## 2025-01-13 DIAGNOSIS — M54.41 CHRONIC RIGHT-SIDED LOW BACK PAIN WITH RIGHT-SIDED SCIATICA: Primary | ICD-10-CM

## 2025-01-13 DIAGNOSIS — G89.29 CHRONIC RIGHT-SIDED LOW BACK PAIN WITH RIGHT-SIDED SCIATICA: Primary | ICD-10-CM

## 2025-01-13 PROCEDURE — 97530 THERAPEUTIC ACTIVITIES: CPT | Performed by: PHYSICAL THERAPIST

## 2025-01-13 PROCEDURE — 97110 THERAPEUTIC EXERCISES: CPT | Performed by: PHYSICAL THERAPIST

## 2025-01-13 PROCEDURE — 97014 ELECTRIC STIMULATION THERAPY: CPT | Performed by: PHYSICAL THERAPIST

## 2025-01-13 PROCEDURE — 97140 MANUAL THERAPY 1/> REGIONS: CPT | Performed by: PHYSICAL THERAPIST

## 2025-01-13 NOTE — PROGRESS NOTES
"Physical Therapy Daily Treatment Note  [facility] PHYSICAL THERAPY  [address] [facility] [address]  [facility] [address] [phone]      Patient: Alf Fields   : 1960  Referring practitioner: Jovi Kelley MD  Date of Initial Visit: Type: THERAPY  Noted: 2025  Today's Date: 2025  Patient seen for 1 sessions       Visit Diagnoses:    ICD-10-CM ICD-9-CM   1. Chronic right-sided low back pain with right-sided sciatica  M54.41 724.2    G89.29 724.3     338.29       Visit #: 2    Subjective   Knee pain is a little better; able to stand on feet 7 hours before knee started to hurt   Back doesn't hurt  Fell and landed on both knees  Got some K tape for knee, have not used it yet  Was OOT in [facility] all last week for work    Objective   See Exercise, Manual, and Modality Logs for complete treatment    Mild edema right knee; pain to palpate MJL/LJL and posterior knee  Limited knee flexion, due to pain in back of knee    No pelvic innominate noted today    Tenderness right lumbar p/s mm and PSIS  Palpation to L4/5 mm peripheralizes pain and tingling to posterior right knee (concordant sign)     Lumbar ROM is WFL, but stiff in all planes; left SB increased pain in right knee    [employer] Exercises:     Access Code: [email or web address]  URL: [email or web address]  Date: 2024  Prepared by: Alf Salas     Exercises  - Supine Piriformis Stretch with Foot on Ground (Mirrored)  - 1 x daily - 7 x weekly - 1 sets - 5 reps - 20 hold  - Supine Figure 4 Piriformis Stretch (Mirrored)  - 1 x daily - 7 x weekly - 1 sets - 5 reps - 20 hold  - Bent Knee Fallouts  - 1 x daily - 7 x weekly - 1 sets - 20 reps  - Supine Lower Trunk Rotation  - 1 x daily - 7 x weekly - 1 sets - 5 reps - 10-20\" hold  - Cat Cow to Child's Pose  - 1 x daily - 7 x weekly - 1 sets - 10 reps  - Supine 90/90 Sciatic Nerve Glide with Knee Flexion/Extension  - 1 x daily - 7 x weekly - 2 sets - 10 reps  - 90/90 SI " Joint Self-Correction with Dowel (Mirrored)  - 1 x daily - 7 x weekly - 1 sets - 10 reps - 10 hold       Assessment/Plan  S/s consistent with lumbar radiculopathy; no pain in back, pain primarily in right knee; + slump test with pain in posterior right knee; responded well to PT today  Discussed knee compression brace options for edema reduction while on feet all day for work per patient request     Treatment considerations for next visit:  Advance as tolerated; will try to get a second visit in this week, since he is not traveling [place]OT for work    Timed:   Manual Therapy:    25     mins  96184;     Therapeutic Exercise:    15     mins  97750;     Neuromuscular Malachi:        mins  61344;    Therapeutic Activity:     15     mins  06730;     Gait Training:           mins  52017;     Ultrasound:          mins  37922;    Ionto                                   mins   57193  Self Care                            mins   81380  Canalith Repos         mins   92949    Un-Timed:  Electrical Stimulation:    10     mins  21874 ( );  Dry Needling          mins self-pay  Traction          mins 75171      Timed Treatment:   55   mins   Total Treatment:     65   mins    PEDRO Salas, PT  [place] License: [id number]

## 2025-01-16 ENCOUNTER — APPOINTMENT (OUTPATIENT)
Dept: CT IMAGING | Facility: HOSPITAL | Age: 65
End: 2025-01-16
Payer: OTHER MISCELLANEOUS

## 2025-01-16 ENCOUNTER — HOSPITAL ENCOUNTER (EMERGENCY)
Facility: HOSPITAL | Age: 65
Discharge: HOME | End: 2025-01-16
Attending: STUDENT IN AN ORGANIZED HEALTH CARE EDUCATION/TRAINING PROGRAM
Payer: OTHER MISCELLANEOUS

## 2025-01-16 VITALS
TEMPERATURE: 97.8 F | WEIGHT: 214.07 LBS | RESPIRATION RATE: 18 BRPM | SYSTOLIC BLOOD PRESSURE: 158 MMHG | HEIGHT: 69 IN | HEART RATE: 70 BPM | DIASTOLIC BLOOD PRESSURE: 100 MMHG | OXYGEN SATURATION: 99 % | BODY MASS INDEX: 31.71 KG/M2

## 2025-01-16 DIAGNOSIS — S09.90XA INJURY OF HEAD, INITIAL ENCOUNTER: Primary | ICD-10-CM

## 2025-01-16 PROCEDURE — 99284 EMERGENCY DEPT VISIT MOD MDM: CPT | Performed by: STUDENT IN AN ORGANIZED HEALTH CARE EDUCATION/TRAINING PROGRAM

## 2025-01-16 PROCEDURE — 70450 CT HEAD/BRAIN W/O DYE: CPT

## 2025-01-16 NOTE — ED PROVIDER NOTES
Subjective:  History of Present Illness:    Patient is a 64-year-old male without contributing health history.  Presents to the ER today with head injury.  Patient reports that he was walking slipped and fell on ice.  Denies any pain.  Reports nausea.  Denies vomiting.  Reports severe headache.  Denies blood thinner.  Denies LOC.  Denies OTC medication home remedy.  Denies alleviating exacerbating factors.    Nurses Notes reviewed and agree, including vitals, allergies, social history and prior medical history.     REVIEW OF SYSTEMS: All systems reviewed and not pertinent unless noted.  Review of Systems   Gastrointestinal:  Positive for nausea.   Neurological:  Positive for headaches.   All other systems reviewed and are negative.      Past Medical History:   Diagnosis Date    Anxiety     Comes and goes with stress levels    Degenerative joint disease (DJD) of lumbar spine     Erectile dysfunction 2018    Can't afford the meds    Low back pain        Allergies:    Patient has no known allergies.      Past Surgical History:   Procedure Laterality Date    ANKLE ARTHROSCOPY Left 2010    CHOLECYSTECTOMY      COLONOSCOPY  2018         Social History     Socioeconomic History    Marital status:      Spouse name: Arely    Number of children: 2    Years of education: H.S.   Tobacco Use    Smoking status: Some Days     Current packs/day: 1.50     Average packs/day: 1.5 packs/day for 20.3 years (30.4 ttl pk-yrs)     Types: Cigarettes     Start date: 10/1/2004    Smokeless tobacco: Never    Tobacco comments:     2-3 cigarettes a day   Vaping Use    Vaping status: Never Used   Substance and Sexual Activity    Alcohol use: No     Comment: Rare    Drug use: No    Sexual activity: Yes     Partners: Female     Birth control/protection: Post-menopausal         Family History   Problem Relation Age of Onset    Drug abuse Mother         Heroin    Alcohol abuse Sister     Cataracts Maternal Grandmother     Arthritis Maternal  "Grandmother     No Known Problems Daughter     Anxiety disorder Daughter     No Known Problems Son     Anxiety disorder Son     Early death Maternal Aunt         Hodgkins was 22 years old       Objective  Physical Exam:  /100   Pulse 70   Temp 97.8 °F (36.6 °C) (Oral)   Resp 18   Ht 175.3 cm (69.02\")   Wt 97.1 kg (214 lb 1.1 oz)   SpO2 99%   BMI 31.60 kg/m²      Physical Exam  Vitals and nursing note reviewed.   Constitutional:       Appearance: Normal appearance. He is normal weight.   HENT:      Head: Normocephalic and atraumatic.      Nose: Nose normal.      Mouth/Throat:      Mouth: Mucous membranes are moist.      Pharynx: Oropharynx is clear.   Eyes:      Extraocular Movements: Extraocular movements intact.      Conjunctiva/sclera: Conjunctivae normal.      Pupils: Pupils are equal, round, and reactive to light.   Cardiovascular:      Rate and Rhythm: Normal rate and regular rhythm.   Pulmonary:      Effort: Pulmonary effort is normal.      Breath sounds: Normal breath sounds.   Abdominal:      General: Abdomen is flat. Bowel sounds are normal.      Palpations: Abdomen is soft.   Musculoskeletal:         General: Normal range of motion.      Cervical back: Normal range of motion and neck supple.   Skin:     General: Skin is warm and dry.      Capillary Refill: Capillary refill takes less than 2 seconds.   Neurological:      General: No focal deficit present.      Mental Status: He is alert and oriented to person, place, and time. Mental status is at baseline.   Psychiatric:         Mood and Affect: Mood normal.         Behavior: Behavior normal.         Thought Content: Thought content normal.         Judgment: Judgment normal.         Procedures    ED Course:         Lab Results (last 24 hours)       ** No results found for the last 24 hours. **             CT Head Without Contrast    Result Date: 1/16/2025  PROCEDURE: CT HEAD WO CONTRAST-  HISTORY: Head injury, headache  COMPARISON: None.  " TECHNIQUE: Multiple axial CT images were performed from the foramen magnum to the vertex. Individualized dose reduction techniques using automated exposure control or adjustment of mA and/or kV according to the patient size were employed.  FINDINGS: There is mild generalized cerebral atrophy. The ventricles are enlarged. There is no evidence of edema or hemorrhage.  No masses are identified. No extra-axial fluid is seen. The paranasal sinuses are unremarkable.      Impression: Atrophy  without acute process.     CTDI: 37.14 mGy DLP:744.76 mGy.cm  This report was signed and finalized on 1/16/2025 12:57 PM by Myra Kennedy MD.          MDM      Initial impression of presenting illness: Patient is a 64-year-old male without contributing health history.  Presents to the ER today with head injury.  Patient reports that he was walking slipped and fell on ice.  Denies any pain.  Reports nausea.  Denies vomiting.  Reports severe headache.  Denies blood thinner.  Denies LOC.  Denies OTC medication home remedy.  Denies alleviating exacerbating factors.    DDX: includes but is not limited to: Contusion, abrasion, subdural hemorrhage or other    Patient arrives stable with vitals interpreted by myself.     Pertinent features from physical exam: No physical evidence of scalp hematoma.  Eyes are PERRL, EOM's intact.  Benign assessment    Initial diagnostic plan: Head CT    Results from initial plan were reviewed and interpreted by me revealing head CT following impression atrophy without acute process    Diagnostic information from other sources: Chart    Interventions / Re-evaluation: Oxygen stable throughout counter    Results/clinical rationale were discussed with patient    Consultations/Discussion of results with other physicians: N/A    Disposition plan: Patient is hemodynamically stable nontoxic-appearing appropriate discharge.  Will have patient follow-up PCP within the week.  Follow-up ER for new or symptoms.  -----         Final diagnoses:   Injury of head, initial encounter          Blake Mejía, APRN  01/16/25 [id number]

## 2025-01-20 ENCOUNTER — TREATMENT (OUTPATIENT)
Dept: PHYSICAL THERAPY | Facility: CLINIC | Age: 65
End: 2025-01-20
Payer: COMMERCIAL

## 2025-01-20 DIAGNOSIS — G89.29 CHRONIC RIGHT-SIDED LOW BACK PAIN WITH RIGHT-SIDED SCIATICA: Primary | ICD-10-CM

## 2025-01-20 DIAGNOSIS — M54.41 CHRONIC RIGHT-SIDED LOW BACK PAIN WITH RIGHT-SIDED SCIATICA: Primary | ICD-10-CM

## 2025-01-20 PROCEDURE — 97112 NEUROMUSCULAR REEDUCATION: CPT | Performed by: PHYSICAL THERAPIST

## 2025-01-20 PROCEDURE — 97110 THERAPEUTIC EXERCISES: CPT | Performed by: PHYSICAL THERAPIST

## 2025-01-20 PROCEDURE — 97140 MANUAL THERAPY 1/> REGIONS: CPT | Performed by: PHYSICAL THERAPIST

## 2025-01-20 PROCEDURE — 97530 THERAPEUTIC ACTIVITIES: CPT | Performed by: PHYSICAL THERAPIST

## 2025-01-20 NOTE — PROGRESS NOTES
"Physical Therapy Daily Treatment Note  [facility] PHYSICAL THERAPY  [address] [facility] [address]  [facility] [address] [phone]      Patient: Alf Fields   : 1960  Referring practitioner: Jovi Kelley MD  Date of Initial Visit: Type: THERAPY  Noted: 2025  Today's Date: 2025  Patient seen for  3 sessions       Visit Diagnoses:    ICD-10-CM ICD-9-CM   1. Chronic right-sided low back pain with right-sided sciatica  M54.41 724.2    G89.29 724.3     338.29       Visit #: 3    Subjective   Was feeling a lot better, but slipped on ice on 16th while working, got a concussion, went to ER; sore all over, but back of knee area feels better  Felt better after last visit    Objective   See Exercise, Manual, and Modality Logs for complete treatment    Mild hypertonicity right lumbar p/s mm     [employer] Exercises:  URL: [email or web address]  Date: 2024  Prepared by: Alf Salas     Exercises  - Supine Piriformis Stretch with Foot on Ground (Mirrored)  - 1 x daily - 7 x weekly - 1 sets - 5 reps - 20 hold  - Supine Figure 4 Piriformis Stretch (Mirrored)  - 1 x daily - 7 x weekly - 1 sets - 5 reps - 20 hold  - Bent Knee Fallouts  - 1 x daily - 7 x weekly - 1 sets - 20 reps  - Supine Lower Trunk Rotation  - 1 x daily - 7 x weekly - 1 sets - 5 reps - 10-20\" hold  - Cat Cow to Child's Pose  - 1 x daily - 7 x weekly - 1 sets - 10 reps  - Supine 90/90 Sciatic Nerve Glide with Knee Flexion/Extension  - 1 x daily - 7 x weekly - 2 sets - 10 reps  - 90/90 SI Joint Self-Correction with Dowel (Mirrored)  - 1 x daily - 7 x weekly - 1 sets - 10 reps - 10 hold     UBE, pulleys  DKTC with green ball and strap  Swiss ball HS iso's  Nu Step x 10 min    Assessment/Plan  Alf Fields needs continued skilled Physical Therapy services for decreasing pain while improving mobility, strength, balance and endurance in order to return to work and/or activities of daily living " without pain or dysfunction      Treatment considerations for next visit:  Advance as tolerated    Timed:   Manual Therapy:    10     mins  64414;     Therapeutic Exercise:    15     mins  00482;     Neuromuscular Malachi:    15    mins  15277;    Therapeutic Activity:     15     mins  09669;     Gait Training:           mins  41795;     Ultrasound:          mins  03254;    Ionto                                   mins   03853  Self Care                            mins   92999  Canalith Repos         mins   70473    Un-Timed:  Electrical Stimulation:         mins  26532 ( );  Dry Needling          mins self-pay  Traction          mins 41847      Timed Treatment:   55   mins   Total Treatment:     55   mins    PEDRO Salas, PT  [place] License: [id number]

## 2025-01-27 ENCOUNTER — TREATMENT (OUTPATIENT)
Dept: PHYSICAL THERAPY | Facility: CLINIC | Age: 65
End: 2025-01-27
Payer: COMMERCIAL

## 2025-01-27 DIAGNOSIS — G89.29 CHRONIC RIGHT-SIDED LOW BACK PAIN WITH RIGHT-SIDED SCIATICA: Primary | ICD-10-CM

## 2025-01-27 DIAGNOSIS — M54.41 CHRONIC RIGHT-SIDED LOW BACK PAIN WITH RIGHT-SIDED SCIATICA: Primary | ICD-10-CM

## 2025-01-27 PROCEDURE — 97530 THERAPEUTIC ACTIVITIES: CPT | Performed by: PHYSICAL THERAPIST

## 2025-01-27 PROCEDURE — 97110 THERAPEUTIC EXERCISES: CPT | Performed by: PHYSICAL THERAPIST

## 2025-01-27 PROCEDURE — 97140 MANUAL THERAPY 1/> REGIONS: CPT | Performed by: PHYSICAL THERAPIST

## 2025-01-27 NOTE — PROGRESS NOTES
"Physical Therapy Daily Treatment Note  [facility] PHYSICAL THERAPY  [address]  [facility] [address] [phone]      Patient: Alf Fields   : 1960  Referring practitioner: Jovi Kelley MD  Date of Initial Visit: Type: THERAPY  Noted: 2025  Today's Date: 2025  Patient seen for 4 sessions       Visit Diagnoses:    ICD-10-CM ICD-9-CM   1. Chronic right-sided low back pain with right-sided sciatica  M54.41 724.2    G89.29 724.3     338.29       Visit #: 4    Subjective   Patient states he is doing extraordinarily well; almost canceled today due to the fact that he was feeling so good  Feels like the stretches are helping a lot  Not having that pain behind the knee like he was before    Objective   See Exercise, Manual, and Modality Logs for complete treatment    Mild TTP right SI joint/PSIS     The [employer] Girl Exercises:  URL: [email or web address]  Date: 2024  Prepared by: Alf Salas     Exercises  - Supine Piriformis Stretch with Foot on Ground (Mirrored)  - 1 x daily - 7 x weekly - 1 sets - 5 reps - 20 hold  - Supine Figure 4 Piriformis Stretch (Mirrored)  - 1 x daily - 7 x weekly - 1 sets - 5 reps - 20 hold  - Bent Knee Fallouts  - 1 x daily - 7 x weekly - 1 sets - 20 reps  - Supine Lower Trunk Rotation  - 1 x daily - 7 x weekly - 1 sets - 5 reps - 10-20\" hold  - Cat Cow to Child's Pose  - 1 x daily - 7 x weekly - 1 sets - 10 reps  - Supine 90/90 Sciatic Nerve Glide with Knee Flexion/Extension  - 1 x daily - 7 x weekly - 2 sets - 10 reps  - 90/90 SI Joint Self-Correction with Dowel (Mirrored)  - 1 x daily - 7 x weekly - 1 sets - 10 reps - 10 hold     UBE, pulleys  DKTC with green ball and strap  Swiss ball HS iso's  Nu Step x 10 min    Assessment/Plan  Alf Fields needs continued skilled Physical Therapy services for decreasing pain while improving mobility, strength, balance and endurance in order to return to work and/or " activities of daily living without pain or dysfunction      Treatment considerations for next visit:  Advance as tolerated ; F/u in 2 weeks; will be OOT for work    Timed:   Manual Therapy:    10     mins  53391;     Therapeutic Exercise:    15     mins  91466;     Neuromuscular Malachi:        mins  86171;    Therapeutic Activity:     15     mins  87327;     Gait Training:           mins  44311;     Ultrasound:          mins  19806;    Ionto                                   mins   32672  Self Care                            mins   95140  Canalith Repos         mins   21417    Un-Timed:  Electrical Stimulation:         mins  51729 ( );  Dry Needling          mins self-pay  Traction          mins 00328      Timed Treatment:   40   mins   Total Treatment:     40   mins    PEDRO Salas, PT  [place] License: [id number]

## 2025-02-20 ENCOUNTER — TELEPHONE (OUTPATIENT)
Dept: FAMILY MEDICINE CLINIC | Facility: CLINIC | Age: 65
End: 2025-02-20

## 2025-02-20 NOTE — TELEPHONE ENCOUNTER
Caller: [facility]    Relationship:     Best call back number: [phone]         What specialty or service is being requested: CT CHEST    What is the provider, practice or medical service name: [facility]    What is the office location: [place]    What is the office phone number: FAX# [phone]    Any additional details: PATIENT HAS A REFERRAL FOR A CT CHEST THAT IS SCHEDULED FOR 2/24/25. THE PATIENT IS GOING TO CANCEL THAT AND HAS ASKED THE REFERRAL BE SENT TO [facility]

## 2025-03-12 ENCOUNTER — PATIENT MESSAGE (OUTPATIENT)
Dept: FAMILY MEDICINE CLINIC | Facility: CLINIC | Age: 65
End: 2025-03-12
Payer: COMMERCIAL

## 2025-03-17 ENCOUNTER — OFFICE VISIT (OUTPATIENT)
Dept: FAMILY MEDICINE CLINIC | Facility: CLINIC | Age: 65
End: 2025-03-17
Payer: COMMERCIAL

## 2025-03-17 VITALS
TEMPERATURE: 97.5 F | WEIGHT: 215 LBS | OXYGEN SATURATION: 99 % | BODY MASS INDEX: 31.84 KG/M2 | DIASTOLIC BLOOD PRESSURE: 86 MMHG | HEIGHT: 69 IN | SYSTOLIC BLOOD PRESSURE: 124 MMHG | HEART RATE: 92 BPM

## 2025-03-17 DIAGNOSIS — M25.561 CHRONIC PAIN OF RIGHT KNEE: ICD-10-CM

## 2025-03-17 DIAGNOSIS — K21.9 GASTROESOPHAGEAL REFLUX DISEASE, UNSPECIFIED WHETHER ESOPHAGITIS PRESENT: ICD-10-CM

## 2025-03-17 DIAGNOSIS — R79.89 LOW TESTOSTERONE: ICD-10-CM

## 2025-03-17 DIAGNOSIS — M54.41 CHRONIC RIGHT-SIDED LOW BACK PAIN WITH RIGHT-SIDED SCIATICA: Primary | ICD-10-CM

## 2025-03-17 DIAGNOSIS — G89.29 CHRONIC RIGHT-SIDED LOW BACK PAIN WITH RIGHT-SIDED SCIATICA: Primary | ICD-10-CM

## 2025-03-17 DIAGNOSIS — E78.2 MIXED HYPERLIPIDEMIA: ICD-10-CM

## 2025-03-17 DIAGNOSIS — G89.29 CHRONIC PAIN OF RIGHT KNEE: ICD-10-CM

## 2025-03-17 PROCEDURE — 99213 OFFICE O/P EST LOW 20 MIN: CPT | Performed by: FAMILY MEDICINE

## 2025-03-17 NOTE — ASSESSMENT & PLAN NOTE
Likely secondary to arthritis.  Patient can use ibuprofen and Tylenol as needed and will start turmeric 500 mg twice daily.  RTC/ED precautions given.

## 2025-03-17 NOTE — PROGRESS NOTES
Alf Fields is a 65 y.o. male who presents today for Back Pain and Knee Pain      HPI     Patient has not found cyclobenzaprine, tizanidine, or methocarbamol helpful with the back pain.  Patient was prescribed gabapentin 100 mg 3 times daily which was increased to 300 mg twice daily.  Patient has seen physical therapy but has not seen them since January.  He had an MRI ordered but this has not been completed.  He also had referral placed to pain management but appointment has not been scheduled. He has had significant improvement in back pain with supportive measures. He has not needed the gabapentin or zanaflex. He has been using ibuprofen and tylenol as needed. He has having right knee pain since November. He discussed the knee pain with PT and they worked on it a little a gave him some home exercises to do. He has not had an injury or change in activity level. He can feel it popping and grinding but it does not lock up on him. He has a feeling of instability but it has not given out of him. Pain is aching in nature. Pain is always there and worsens when he gets up after sitting for an extended period.     Review of Systems   Constitutional:  Negative for chills, diaphoresis, fatigue, fever and unexpected weight loss.   HENT:  Negative for congestion, ear pain, sore throat and swollen glands.    Eyes:  Negative for visual disturbance.   Respiratory:  Negative for cough, shortness of breath and wheezing.    Cardiovascular:  Negative for chest pain and palpitations.   Gastrointestinal:  Positive for GERD. Negative for abdominal pain, blood in stool, constipation, diarrhea, nausea and vomiting.   Endocrine: Negative for polydipsia and polyuria.   Genitourinary:  Negative for difficulty urinating and dysuria.   Musculoskeletal:  Positive for arthralgias (right knee pain) and back pain (improving). Negative for joint swelling, myalgias and neck pain.   Skin:  Negative for rash and skin lesions.  "  Allergic/Immunologic: Negative for environmental allergies.   Neurological:  Negative for seizures, syncope, weakness and numbness.   Hematological:  Does not bruise/bleed easily.   Psychiatric/Behavioral:  Negative for suicidal ideas.         The following portions of the patient's history were reviewed and updated as appropriate: allergies, current medications, past family history, past medical history, past social history, past surgical history and problem list.    Current Outpatient Medications on File Prior to Visit   Medication Sig Dispense Refill    [DISCONTINUED] gabapentin (NEURONTIN) 300 MG capsule Take 1 capsule by mouth 2 (Two) Times a Day. 60 capsule 2    [DISCONTINUED] tadalafil (Cialis) 5 MG tablet Take 1 tablet by mouth Daily As Needed for Erectile Dysfunction. 30 tablet 6    [DISCONTINUED] tiZANidine (ZANAFLEX) 4 MG tablet Take 1 tablet by mouth Every 6 (Six) Hours As Needed for Muscle Spasms. 120 tablet 1     No current facility-administered medications on file prior to visit.       No Known Allergies     Visit Vitals  /86   Pulse 92   Temp 97.5 °F (36.4 °C) (Infrared)   Ht 175.3 cm (69\")   Wt 97.5 kg (215 lb)   SpO2 99%   BMI 31.75 kg/m²        Physical Exam  Constitutional:       General: He is not in acute distress.     Appearance: He is well-developed. He is not diaphoretic.   HENT:      Head: Atraumatic.   Cardiovascular:      Rate and Rhythm: Normal rate and regular rhythm.      Heart sounds: Normal heart sounds. No murmur heard.     No friction rub. No gallop.   Pulmonary:      Effort: Pulmonary effort is normal. No respiratory distress.      Breath sounds: Normal breath sounds. No stridor. No wheezing, rhonchi or rales.   Musculoskeletal:      Cervical back: Normal range of motion and neck supple.      Right knee: Bony tenderness and crepitus present. No swelling, deformity, effusion, erythema, ecchymosis or lacerations. Normal range of motion. Tenderness present over the medial " joint line. No lateral joint line, MCL, LCL, ACL, PCL or patellar tendon tenderness. No LCL laxity, MCL laxity, ACL laxity or PCL laxity. Normal alignment, normal meniscus and normal patellar mobility.      Instability Tests: Anterior drawer test negative. Posterior drawer test negative.      Left knee: Crepitus present. No swelling, deformity, effusion, erythema, ecchymosis, lacerations or bony tenderness. Normal range of motion. No tenderness. No LCL laxity, MCL laxity, ACL laxity or PCL laxity.Normal alignment, normal meniscus and normal patellar mobility.      Instability Tests: Anterior drawer test negative. Posterior drawer test negative.   Skin:     General: Skin is warm and dry.   Neurological:      Mental Status: He is alert and oriented to person, place, and time.   Psychiatric:         Behavior: Behavior normal.          Results for orders placed or performed in visit on 11/26/24   Comprehensive Metabolic Panel    Collection Time: 11/26/24  3:48 PM    Specimen: Blood   Result Value Ref Range    Glucose 91 65 - 99 mg/dL    BUN 14 8 - 23 mg/dL    Creatinine 1.04 0.76 - 1.27 mg/dL    Sodium 138 136 - 145 mmol/L    Potassium 4.3 3.5 - 5.2 mmol/L    Chloride 103 98 - 107 mmol/L    CO2 23.9 22.0 - 29.0 mmol/L    Calcium 9.5 8.6 - 10.5 mg/dL    Total Protein 7.4 6.0 - 8.5 g/dL    Albumin 4.6 3.5 - 5.2 g/dL    ALT (SGPT) 23 1 - 41 U/L    AST (SGOT) 23 1 - 40 U/L    Alkaline Phosphatase 89 39 - 117 U/L    Total Bilirubin 0.3 0.0 - 1.2 mg/dL    Globulin 2.8 gm/dL    A/G Ratio 1.6 g/dL    BUN/Creatinine Ratio 13.5 7.0 - 25.0    Anion Gap 11.1 5.0 - 15.0 mmol/L    eGFR 81.2 >60.0 mL/min/1.73   TSH Rfx On Abnormal To Free T4    Collection Time: 11/26/24  3:48 PM    Specimen: Blood   Result Value Ref Range    TSH 1.190 0.270 - 4.200 uIU/mL   Lipid Panel    Collection Time: 11/26/24  3:48 PM    Specimen: Blood   Result Value Ref Range    Total Cholesterol 225 (H) 0 - 200 mg/dL    Triglycerides 153 (H) 0 - 150 mg/dL     HDL Cholesterol 45 40 - 60 mg/dL    LDL Cholesterol  152 (H) 0 - 100 mg/dL    VLDL Cholesterol 28 5 - 40 mg/dL    LDL/HDL Ratio 3.32    PSA Screen    Collection Time: 11/26/24  3:48 PM    Specimen: Blood   Result Value Ref Range    PSA 0.577 0.000 - 4.000 ng/mL   Hemoglobin A1c    Collection Time: 11/26/24  3:48 PM    Specimen: Blood   Result Value Ref Range    Hemoglobin A1C 5.60 4.80 - 5.60 %   CBC Auto Differential    Collection Time: 11/26/24  3:48 PM    Specimen: Blood   Result Value Ref Range    WBC 9.88 3.40 - 10.80 10*3/mm3    RBC 5.20 4.14 - 5.80 10*6/mm3    Hemoglobin 16.0 13.0 - 17.7 g/dL    Hematocrit 46.3 37.5 - 51.0 %    MCV 89.0 79.0 - 97.0 fL    MCH 30.8 26.6 - 33.0 pg    MCHC 34.6 31.5 - 35.7 g/dL    RDW 12.3 12.3 - 15.4 %    RDW-SD 39.6 37.0 - 54.0 fl    MPV 12.1 (H) 6.0 - 12.0 fL    Platelets 238 140 - 450 10*3/mm3    Neutrophil % 54.4 42.7 - 76.0 %    Lymphocyte % 36.0 19.6 - 45.3 %    Monocyte % 6.0 5.0 - 12.0 %    Eosinophil % 2.4 0.3 - 6.2 %    Basophil % 0.9 0.0 - 1.5 %    Immature Grans % 0.3 0.0 - 0.5 %    Neutrophils, Absolute 5.37 1.70 - 7.00 10*3/mm3    Lymphocytes, Absolute 3.56 (H) 0.70 - 3.10 10*3/mm3    Monocytes, Absolute 0.59 0.10 - 0.90 10*3/mm3    Eosinophils, Absolute 0.24 0.00 - 0.40 10*3/mm3    Basophils, Absolute 0.09 0.00 - 0.20 10*3/mm3    Immature Grans, Absolute 0.03 0.00 - 0.05 10*3/mm3    nRBC 0.0 0.0 - 0.2 /100 WBC        Problems Addressed this Visit          Gastrointestinal Abdominal     Gastroesophageal reflux disease    Patient has had some acid reflux mostly occurring at night.  He can work on diet lifestyle modification and try famotidine twice daily which she can purchased over-the-counter to see if this improves symptoms.            Musculoskeletal and Injuries    Chronic right-sided low back pain with right-sided sciatica - Primary    Significant improvement with supportive measures.  Patient will continue supportive measures at home.  RTC/ED precautions  given.         Relevant Orders    Comprehensive Metabolic Panel    Chronic pain of right knee    Likely secondary to arthritis.  Patient can use ibuprofen and Tylenol as needed and will start turmeric 500 mg twice daily.  RTC/ED precautions given.         Relevant Orders    Comprehensive Metabolic Panel     Other Visit Diagnoses         Mixed hyperlipidemia        Relevant Orders    Comprehensive Metabolic Panel    Lipid Panel      Low testosterone        Relevant Orders    Testosterone          Diagnoses         Codes Comments      Chronic right-sided low back pain with right-sided sciatica    -  Primary ICD-10-CM: M54.41, G89.29  ICD-9-CM: 724.2, 724.3, 338.29       Gastroesophageal reflux disease, unspecified whether esophagitis present     ICD-10-CM: K21.9  ICD-9-CM: 530.81       Chronic pain of right knee     ICD-10-CM: M25.561, G89.29  ICD-9-CM: 719.46, 338.29       Mixed hyperlipidemia     ICD-10-CM: E78.2  ICD-9-CM: 272.2       Low testosterone     ICD-10-CM: R79.89  ICD-9-CM: 790.99             Return if symptoms worsen or fail to improve.    Jovi Kelley MD   3/17/2025

## 2025-03-17 NOTE — ASSESSMENT & PLAN NOTE
Patient has had some acid reflux mostly occurring at night.  He can work on diet lifestyle modification and try famotidine twice daily which she can purchased over-the-counter to see if this improves symptoms.

## 2025-03-17 NOTE — ASSESSMENT & PLAN NOTE
Significant improvement with supportive measures.  Patient will continue supportive measures at home.  RTC/ED precautions given.

## 2025-03-25 ENCOUNTER — LAB (OUTPATIENT)
Facility: HOSPITAL | Age: 65
End: 2025-03-25
Payer: COMMERCIAL

## 2025-03-25 DIAGNOSIS — G89.29 CHRONIC RIGHT-SIDED LOW BACK PAIN WITH RIGHT-SIDED SCIATICA: ICD-10-CM

## 2025-03-25 DIAGNOSIS — M25.561 CHRONIC PAIN OF RIGHT KNEE: ICD-10-CM

## 2025-03-25 DIAGNOSIS — E78.2 MIXED HYPERLIPIDEMIA: ICD-10-CM

## 2025-03-25 DIAGNOSIS — G89.29 CHRONIC PAIN OF RIGHT KNEE: ICD-10-CM

## 2025-03-25 DIAGNOSIS — R79.89 LOW TESTOSTERONE: ICD-10-CM

## 2025-03-25 DIAGNOSIS — M54.41 CHRONIC RIGHT-SIDED LOW BACK PAIN WITH RIGHT-SIDED SCIATICA: ICD-10-CM

## 2025-03-25 LAB
ALBUMIN SERPL-MCNC: 4.2 G/DL (ref 3.5–5.2)
ALBUMIN/GLOB SERPL: 1.5 G/DL
ALP SERPL-CCNC: 100 U/L (ref 39–117)
ALT SERPL W P-5'-P-CCNC: 19 U/L (ref 1–41)
ANION GAP SERPL CALCULATED.3IONS-SCNC: 13 MMOL/L (ref 5–15)
AST SERPL-CCNC: 18 U/L (ref 1–40)
BILIRUB SERPL-MCNC: 0.3 MG/DL (ref 0–1.2)
BUN SERPL-MCNC: 13 MG/DL (ref 8–23)
BUN/CREAT SERPL: 12.9 (ref 7–25)
CALCIUM SPEC-SCNC: 9.6 MG/DL (ref 8.6–10.5)
CHLORIDE SERPL-SCNC: 103 MMOL/L (ref 98–107)
CHOLEST SERPL-MCNC: 216 MG/DL (ref 0–200)
CO2 SERPL-SCNC: 25 MMOL/L (ref 22–29)
CREAT SERPL-MCNC: 1.01 MG/DL (ref 0.76–1.27)
EGFRCR SERPLBLD CKD-EPI 2021: 83.6 ML/MIN/1.73
GLOBULIN UR ELPH-MCNC: 2.8 GM/DL
GLUCOSE SERPL-MCNC: 105 MG/DL (ref 65–99)
HDLC SERPL-MCNC: 37 MG/DL (ref 40–60)
LDLC SERPL CALC-MCNC: 153 MG/DL (ref 0–100)
LDLC/HDLC SERPL: 4.07 {RATIO}
POTASSIUM SERPL-SCNC: 4.5 MMOL/L (ref 3.5–5.2)
PROT SERPL-MCNC: 7 G/DL (ref 6–8.5)
SODIUM SERPL-SCNC: 141 MMOL/L (ref 136–145)
TESTOST SERPL-MCNC: 599 NG/DL (ref 193–740)
TRIGL SERPL-MCNC: 142 MG/DL (ref 0–150)
VLDLC SERPL-MCNC: 26 MG/DL (ref 5–40)

## 2025-03-25 PROCEDURE — 80053 COMPREHEN METABOLIC PANEL: CPT

## 2025-03-25 PROCEDURE — 80061 LIPID PANEL: CPT

## 2025-03-25 PROCEDURE — 84403 ASSAY OF TOTAL TESTOSTERONE: CPT

## 2025-04-15 DIAGNOSIS — G89.29 CHRONIC PAIN OF RIGHT KNEE: Primary | ICD-10-CM

## 2025-04-15 DIAGNOSIS — M25.561 CHRONIC PAIN OF RIGHT KNEE: Primary | ICD-10-CM

## 2025-04-21 ENCOUNTER — OFFICE VISIT (OUTPATIENT)
Dept: ORTHOPEDIC SURGERY | Facility: CLINIC | Age: 65
End: 2025-04-21
Payer: COMMERCIAL

## 2025-04-21 ENCOUNTER — RESULTS FOLLOW-UP (OUTPATIENT)
Dept: FAMILY MEDICINE CLINIC | Facility: CLINIC | Age: 65
End: 2025-04-21
Payer: COMMERCIAL

## 2025-04-21 VITALS
DIASTOLIC BLOOD PRESSURE: 84 MMHG | SYSTOLIC BLOOD PRESSURE: 120 MMHG | HEIGHT: 69 IN | BODY MASS INDEX: 31.37 KG/M2 | WEIGHT: 211.8 LBS

## 2025-04-21 DIAGNOSIS — M54.31 SCIATICA OF RIGHT SIDE: ICD-10-CM

## 2025-04-21 DIAGNOSIS — M25.561 ACUTE PAIN OF RIGHT KNEE: Primary | ICD-10-CM

## 2025-04-21 DIAGNOSIS — M17.11 PRIMARY OSTEOARTHRITIS OF RIGHT KNEE: ICD-10-CM

## 2025-04-21 PROCEDURE — 20610 DRAIN/INJ JOINT/BURSA W/O US: CPT | Performed by: ORTHOPAEDIC SURGERY

## 2025-04-21 PROCEDURE — 99203 OFFICE O/P NEW LOW 30 MIN: CPT | Performed by: ORTHOPAEDIC SURGERY

## 2025-04-21 RX ORDER — TRIAMCINOLONE ACETONIDE 40 MG/ML
40 INJECTION, SUSPENSION INTRA-ARTICULAR; INTRAMUSCULAR
Status: COMPLETED | OUTPATIENT
Start: 2025-04-21 | End: 2025-04-21

## 2025-04-21 RX ORDER — BUPIVACAINE HYDROCHLORIDE 2.5 MG/ML
4 INJECTION, SOLUTION EPIDURAL; INFILTRATION; INTRACAUDAL; PERINEURAL
Status: COMPLETED | OUTPATIENT
Start: 2025-04-21 | End: 2025-04-21

## 2025-04-21 RX ORDER — LIDOCAINE HYDROCHLORIDE 10 MG/ML
4 INJECTION, SOLUTION EPIDURAL; INFILTRATION; INTRACAUDAL; PERINEURAL
Status: COMPLETED | OUTPATIENT
Start: 2025-04-21 | End: 2025-04-21

## 2025-04-21 RX ADMIN — LIDOCAINE HYDROCHLORIDE 4 ML: 10 INJECTION, SOLUTION EPIDURAL; INFILTRATION; INTRACAUDAL; PERINEURAL at 11:31

## 2025-04-21 RX ADMIN — TRIAMCINOLONE ACETONIDE 40 MG: 40 INJECTION, SUSPENSION INTRA-ARTICULAR; INTRAMUSCULAR at 11:31

## 2025-04-21 RX ADMIN — BUPIVACAINE HYDROCHLORIDE 4 ML: 2.5 INJECTION, SOLUTION EPIDURAL; INFILTRATION; INTRACAUDAL; PERINEURAL at 11:31

## 2025-04-21 NOTE — PROGRESS NOTES
[facility] Clinic Note        Subjective     Pain of the Right Knee      HPI    Alf Fields is a 65 y.o. male.  New patient with right knee pain for 3 months.  He hurt his knee getting out of a chair.  He has been treated for sciatica going on the right leg.  He has physical therapy.  He has been doing physical therapy for the right knee.  He continues to have pain and mechanical symptoms.  He has swelling in his knee.  Knee pain is 7 out of 10.  Sciatica got better but knee has gotten worse.  His primary care doctor thinks he may have arthritis.    Past Medical History:   Diagnosis Date    Anxiety     Comes and goes with stress levels    Degenerative joint disease (DJD) of lumbar spine     Erectile dysfunction 2018    Can't afford the meds    Low back pain       Past Surgical History:   Procedure Laterality Date    ANKLE ARTHROSCOPY Left 2010    CHOLECYSTECTOMY      COLONOSCOPY  2018      Family History   Problem Relation Age of Onset    Drug abuse Mother         Heroin    Alcohol abuse Sister     Cataracts Maternal Grandmother     Arthritis Maternal Grandmother     No Known Problems Daughter     Anxiety disorder Daughter     No Known Problems Son     Anxiety disorder Son     Early death Maternal Aunt         Hodgkins was 22 years old     Social History     Socioeconomic History    Marital status:      Spouse name: Arely    Number of children: 2    Years of education: H.S.   Tobacco Use    Smoking status: Former     Current packs/day: 0.00     Average packs/day: 1.5 packs/day for 20.3 years (30.4 ttl pk-yrs)     Types: Cigarettes     Start date: 10/1/2004     Quit date: 2025     Years since quittin.2     Passive exposure: Past    Smokeless tobacco: Never    Tobacco comments:     2-3 cigarettes a day   Vaping Use    Vaping status: Never Used   Substance and Sexual Activity    Alcohol use: No     Comment: Rare    Drug use: No    Sexual activity: Yes     Partners: Female     Birth  control/protection: Post-menopausal      No current outpatient medications on file prior to visit.     No current facility-administered medications on file prior to visit.      No Known Allergies       Review of Systems   Constitutional:  Negative for activity change, appetite change, chills, diaphoresis, fatigue, fever and unexpected weight change.   HENT:  Negative for congestion, dental problem, drooling, ear discharge, ear pain, facial swelling, hearing loss, mouth sores, nosebleeds, postnasal drip, rhinorrhea, sinus pressure, sneezing, sore throat, tinnitus, trouble swallowing and voice change.    Eyes:  Negative for photophobia, pain, discharge, redness, itching and visual disturbance.   Respiratory:  Negative for apnea, cough, choking, chest tightness, shortness of breath, wheezing and stridor.    Cardiovascular:  Negative for chest pain, palpitations and leg swelling.   Gastrointestinal:  Negative for abdominal distention, abdominal pain, anal bleeding, blood in stool, constipation, diarrhea, nausea, rectal pain and vomiting.   Endocrine: Negative for cold intolerance, heat intolerance, polydipsia, polyphagia and polyuria.   Genitourinary:  Negative for decreased urine volume, difficulty urinating, dysuria, enuresis, flank pain, frequency, genital sores, hematuria and urgency.   Musculoskeletal:  Positive for arthralgias. Negative for back pain, gait problem, joint swelling, myalgias, neck pain and neck stiffness.   Skin:  Negative for color change, pallor, rash and wound.   Allergic/Immunologic: Negative for environmental allergies, food allergies and immunocompromised state.   Neurological:  Negative for dizziness, tremors, seizures, syncope, facial asymmetry, speech difficulty, weakness, light-headedness, numbness and headaches.   Hematological:  Negative for adenopathy. Does not bruise/bleed easily.   Psychiatric/Behavioral:  Negative for agitation, behavioral problems, confusion, decreased  "concentration, dysphoric mood, hallucinations, self-injury, sleep disturbance and suicidal ideas. The patient is not nervous/anxious and is not hyperactive.         I reviewed the patient's chief complaint, history of present illness, review of systems, past medical history, surgical history, family history, social history, medications and allergy list.        Objective      Physical Exam  /84   Ht 175.3 cm (69.02\")   Wt 96.1 kg (211 lb 12.8 oz)   BMI 31.26 kg/m²     Body mass index is 31.26 kg/m².    General  Mental Status - alert  General Appearance - cooperative, well groomed, not in acute distress  Orientation - Oriented X3  Build & Nutrition - well developed and well nourished  Posture - normal posture  Gait - normal gait       Ortho Exam  Right knee with trace effusion.  Tender medial joint line.  Positive Gross.  Full range of motion.  Stable ligaments.  He has a healed abrasion on his anterior shin that is unrelated    Imaging/Studies Reviewed and Interpreted:  Imaging Results (Last 24 Hours)       Procedure Component Value Units Date/Time    XR Knee 4+ View Right [[id number]] Resulted: 04/21/25 1124     Updated: 04/21/25 1124    Narrative:      Knee X-Rays  Indication: Pain    Upright AP of bilateral knees. Lateral, skiers and Sunrise views of right   knee     Findings:  No fracture  No bony lesion  Normal soft tissues  Very minimal medial joint space narrowing.  Greater than 50% remains.    No prior studies were available for comparison.                Assessment    Assessment:  1. Acute pain of right knee    2. Primary osteoarthritis of right knee    3. Sciatica of right side        Plan:  Continue over-the-counter medication as needed for discomfort  We discussed different treatment options.  NSAIDs, physical therapy and a steroid shot.  We also discussed getting an MRI to rule out a meniscus tear.  He either has early onset arthritis or a meniscus tear.  He would like to do the steroid shot.  I " injected his right knee with cortisone.   I discussed with the patient the potential benefits of performing a therapeutic injection of the cortisone as well as potential risks including but not limited to infection, swelling, pain, bleeding, bruising, nerve/vessel damage, skin color changes, transient elevation in blood glucose levels, and fat atrophy. After informed consent and verifying correct patient, procedure site, and type of procedure, the area was prepped with Hibiclens, ethyl chloride was used to numb the skin. The patient tolerated the procedure well. There were no complications.  He will follow-up after the injection wears off.        Hector Weber MD  04/21/25  11:32 EDT      Dictated Utilizing Dragon Dictation.

## 2025-04-21 NOTE — PROGRESS NOTES
Procedure   - Large Joint Arthrocentesis: R knee on 4/21/2025 11:31 AM  Indications: pain  Details: 21 G needle, anterolateral approach  Medications: 4 mL lidocaine PF 1% 1 %; 40 mg triamcinolone acetonide 40 MG/ML; 4 mL bupivacaine (PF) 0.25 %  Outcome: tolerated well, no immediate complications  Procedure, treatment alternatives, risks and benefits explained, specific risks discussed. Consent was given by the patient. Immediately prior to procedure a time out was called to verify the correct patient, procedure, equipment, support staff and site/side marked as required. Patient was prepped and draped in the usual sterile fashion.

## 2025-04-22 NOTE — TELEPHONE ENCOUNTER
"Name: Alf Fields \"Manny\"      Relationship: Self      Best Callback Number:   Telephone Information:   Mobile [phone]       HUB PROVIDED THE RELAY MESSAGE FROM THE OFFICE      PATIENT: VOICED UNDERSTANDING AND HAS NO FURTHER QUESTIONS AT THIS TIME    ADDITIONAL INFORMATION:    "

## 2025-04-22 NOTE — TELEPHONE ENCOUNTER
Okay for the HUB to relay:     Please let the patient know that labs that were drawn at previous visit were stable. Patient should continue current treatment plan.  If patient has any questions they can contact me.

## 2025-04-24 ENCOUNTER — TELEPHONE (OUTPATIENT)
Dept: FAMILY MEDICINE CLINIC | Facility: CLINIC | Age: 65
End: 2025-04-24
Payer: COMMERCIAL

## 2025-04-24 NOTE — TELEPHONE ENCOUNTER
I called spoke to  Donnie to remind him of his pending CT Chest and he stated that he had it done 3 weeks ago at [facility].

## 2025-05-26 ENCOUNTER — E-VISIT (OUTPATIENT)
Dept: FAMILY MEDICINE CLINIC | Facility: TELEHEALTH | Age: 65
End: 2025-05-26

## 2025-05-26 PROCEDURE — FABRICHEALTHVISIT: Performed by: NURSE PRACTITIONER

## 2025-05-26 RX ORDER — DEXTROMETHORPHAN HYDROBROMIDE AND PROMETHAZINE HYDROCHLORIDE 15; 6.25 MG/5ML; MG/5ML
5 SYRUP ORAL 4 TIMES DAILY PRN
Qty: 120 ML | Refills: 0 | Status: SHIPPED | OUTPATIENT
Start: 2025-05-26

## 2025-05-26 RX ORDER — DEXTROMETHORPHAN HYDROBROMIDE AND PROMETHAZINE HYDROCHLORIDE 15; 6.25 MG/5ML; MG/5ML
5 SYRUP ORAL 4 TIMES DAILY PRN
Qty: 120 ML | Refills: 0 | Status: SHIPPED | OUTPATIENT
Start: 2025-05-26 | End: 2025-05-26 | Stop reason: SDUPTHER

## 2025-05-26 RX ORDER — PREDNISONE 10 MG/1
TABLET ORAL
Qty: 21 TABLET | Refills: 0 | Status: SHIPPED | OUTPATIENT
Start: 2025-05-26

## 2025-05-26 RX ORDER — ALBUTEROL SULFATE 90 UG/1
2 INHALANT RESPIRATORY (INHALATION) EVERY 4 HOURS PRN
Qty: 18 G | Refills: 0 | Status: SHIPPED | OUTPATIENT
Start: 2025-05-26

## 2025-05-26 RX ORDER — ALBUTEROL SULFATE 90 UG/1
2 INHALANT RESPIRATORY (INHALATION) EVERY 4 HOURS PRN
Qty: 18 G | Refills: 0 | Status: SHIPPED | OUTPATIENT
Start: 2025-05-26 | End: 2025-05-26 | Stop reason: SDUPTHER

## 2025-05-26 RX ORDER — PREDNISONE 10 MG/1
TABLET ORAL
Qty: 21 TABLET | Refills: 0 | Status: SHIPPED | OUTPATIENT
Start: 2025-05-26 | End: 2025-05-26 | Stop reason: SDUPTHER

## 2025-05-26 NOTE — E-VISIT TREATED
Date: 2025 07:45:28  Clinician: Myra Colindres  Clinician NPI: [id number]  Patient: Alf Fields  Patient : 1960  Patient Address: [address] [place] [address]  Patient Phone: [phone]  Visit Protocol: URI  Patient Summary:  Alf is a 65 year old ( : 1960 ) male who initiated a visit for cold, sinus infection, or influenza.     Alf states the symptoms started suddenly 3-5 days ago. After the symptoms started, they improved and then got worse   again.   Symptom start date: 2025   The symptoms consist of rhinitis, chills, nasal congestion, a sore throat, a headache, wheezing, a cough, malaise, and myalgia. Alf is experiencing difficulty breathing due to nasal congestion but is not short   of breath. Alf also feels feverish.   Symptom details     Nasal secretions: The color of the mucus is clear.    Cough: Alf coughs every 5-10 minutes and the cough is more bothersome at night. Phlegm comes into the throat when coughing. Alf believes the cough is caused by post-nasal drip. The color of the phlegm is yellow.     Sore throat: Alf reports having moderate throat pain (4-6 on a 10 point pain scale), does not have exudate on the tonsils, and can swallow liquids without any difficulty. Alf is not sure if the lymph nodes in the neck are enlarged. A rash has not   appeared on the skin since the sore throat started.     Temperature: Current temperature is 99.8 degrees Fahrenheit.     Wheezing: Wheezing impacts daily activities sometimes.     Headache: The headache is moderate (4-6 on a 10 point pain scale).      Alf denies having ear pain, vomiting, diarrhea, anosmia and ageusia, teeth pain, facial pain or pressure, and nausea. Alf also denies having a sinus infection within the past year, pain in the front of the neck that sometimes moves to the ear,   experiencing difficulty opening their mouth due to pain or swelling in the jaw muscles, taking antibiotic  medication in the past month, and having recent facial or sinus surgery in the past 60 days.   Precipitating events  Alf is not sure if they have   been exposed to someone with strep throat. Alf has not recently been exposed to someone with influenza. Alf has not been in close contact with any high risk individuals.   Alf has not received the RSV vaccine.   Alf has received the   influenza vaccine more than 2 weeks ago.   Pertinent COVID-19 (Coronavirus) information  Since the symptoms started, Alf has not tested for COVID-19. Alf was not able to re-test today.   Alf has not received a COVID-19 vaccine in the past year.     Pertinent medical history     A provider has not told Alf to avoid NSAIDs.   Alf does not have diabetes. Alf denies having immunosuppressive conditions (e.g., chemotherapy, HIV, organ transplant, long-term use of steroids or other   immunosuppressive medications, splenectomy). Alf denies having severe COPD, heart disease, and congestive heart failure. Alf does not have asthma.   Alf denies having chronic lung disease, cystic fibrosis, hypertension, long-term disabilities,   mental health conditions, sickle cell disease or thalassemia, stroke or other cardiovascular disease, substance use disorders, or tuberculosis (TB).  Alf does not smoke or use smokeless tobacco. Alf does not vape or use other e-cigarette products.     Additional information as reported by the patient (free text): Tested positive for Covid 3 weeks ago.   Weight: 210 lbs (95.25 kg)    MEDICATIONS: ibuprofen-acetaminophen oral, ALLERGIES: NKDA  Clinician Response:  Dear Alf,  Based on the information provided, you have acute bacterial sinusitis, also known as a sinus infection. Most cases of sinus infections are caused by viruses and symptoms start to improve on their own in 7-10 days. Both   viral and bacterial sinus infections usually resolve on its own. A bacterial infection  may have developed if any of the following apply to you.      Symptoms of sinus infection lasting 10 days or more without showing any improvement    If you feel better after a viral upper respiratory infection such as, a cold but then suddenly feel worse with symptoms such as fever, headache, or increase in nasal discharge     Based on the information provided, you have viral bronchitis, also known as a chest cold. This is a cough that occurs when a cold or other virus settles into your chest. The cough may be dry or you could notice you are coughing up some phlegm.  It is   not unusual for a cough to last 3 weeks or more. Treatment focuses on controlling your symptoms as much as possible while you recover.  Medication information  Because you have a viral infection, antibiotics will not help you get better. Treating a viral   infection with antibiotics could actually make you feel worse.  I am prescribing:       Amoxicillin-pot clavulanate 875-125 mg oral tablet. Take 1 tablet by mouth every 12 hours for 7 days. There are no refills with this prescription.      Promethazine-DM 6.25-15 mg/5 mL oral syrup. Take 5 mL by mouth every 4-6 hours as needed (maximum of 30 mL in 24 hours) for cough. This medication can cause drowsiness. There are no refills with this prescription.      Albuterol sulfate (Ventolin HFA) 90 mcg/actuation aerosol inhaler. Inhale 2 puffs every 4-6 hours as needed for 5 days. There are no refills with this prescription.     Self care  Steps you can take to be as comfortable as possible:     Rest.    Drink plenty of fluids.    Take a warm shower to loosen congestion.    Use a cool-mist humidifier.    Use throat lozenges.    Suck on frozen items such as popsicles.    Drink hot tea with lemon and honey.    Gargle with warm salt water (1/4 teaspoon of salt per 8 ounce glass of water).    Take a spoonful of honey to reduce your cough.     When to seek care  Please be seen in a clinic or urgent care if  any of the following occur:     New symptoms develop, or symptoms become worse    Symptoms do not start to improve after 3 days of treatment     Call 911 or go to the emergency room if any of the following occur:     Difficulty breathing    If you feel that your throat is closing off    Suddenly develop a rash    Unable to swallow fluids or are drooling     It is possible to have an allergic reaction to an antibiotic even if you have not had one in the past. If you notice a new rash, significant swelling, or difficulty breathing, stop taking this medication immediately and go to a clinic or urgent care.    For the latest updates on COVID-19 (Coronavirus), please visit the Centers for Disease Control and Prevention (CDC). Also, your state and local health department websites may provide additional guidance regarding testing and isolation recommendations for   your location.   Diagnosis: Acute bacterial sinusitis  Diagnosis ICD: J01.90    Follow up instructions:  ATTENTION: If you have been prescribed medications, your prescriptions will not be sent until you choose your pharmacy. To do so open the link within your notification, or go to [employer] and click eVisit in the menu to open your   treatment plan. From there, you can select your pharmacy at the bottom of your after visit summary. You can also go to [email or web address]   Prescriptions  Prescription: prednisone 10 mg oral tablets,dose pack, take tablets,dose pack  Sent To: [facility] #[id number] - [phone] - [address] [place] [address]  Prescription: promethazine-DM 6.25-15 mg/5 mL oral syrup, take 5 milliliters by mouth every 4 to 6 hours as needed for cough  Sent To: [facility] #[id number] - [phone] - [address] [place] [address]  Prescription: albuterol sulfate (Ventolin HFA) 90 mcg/actuation inhalation HFA aerosol inhaler, inhale 2 puffs every 4-6 hours as needed for 5  days  Sent To: [facility] #[id number] - [phone] - [address] [place] [address]  Prescription: amoxicillin-pot clavulanate 875-125 mg oral tablet, take 1 tablet by mouth every 12 hours for 7 days  Sent To: [facility] #[id number] - [id number] - [address] [facility],  [place] [address]